# Patient Record
Sex: FEMALE | Race: OTHER | URBAN - METROPOLITAN AREA
[De-identification: names, ages, dates, MRNs, and addresses within clinical notes are randomized per-mention and may not be internally consistent; named-entity substitution may affect disease eponyms.]

---

## 2017-04-27 ENCOUNTER — EMERGENCY (EMERGENCY)
Facility: HOSPITAL | Age: 32
LOS: 0 days | Discharge: HOME | End: 2017-04-28
Admitting: FAMILY MEDICINE
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
## 2017-06-28 DIAGNOSIS — S06.0X9A CONCUSSION WITH LOSS OF CONSCIOUSNESS OF UNSPECIFIED DURATION, INITIAL ENCOUNTER: ICD-10-CM

## 2017-06-28 DIAGNOSIS — Z98.84 BARIATRIC SURGERY STATUS: ICD-10-CM

## 2017-06-28 DIAGNOSIS — S09.90XA UNSPECIFIED INJURY OF HEAD, INITIAL ENCOUNTER: ICD-10-CM
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
## 2017-11-09 ENCOUNTER — EMERGENCY (EMERGENCY)
Facility: HOSPITAL | Age: 32
LOS: 1 days | End: 2017-11-09
Admitting: FAMILY MEDICINE

## 2017-11-09 DIAGNOSIS — N61.0 MASTITIS WITHOUT ABSCESS: ICD-10-CM

## 2017-11-09 DIAGNOSIS — Z98.84 BARIATRIC SURGERY STATUS: ICD-10-CM

## 2017-11-09 DIAGNOSIS — K45.0 OTHER SPECIFIED ABDOMINAL HERNIA WITH OBSTRUCTION, WITHOUT GANGRENE: ICD-10-CM

## 2017-11-18 ENCOUNTER — EMERGENCY (EMERGENCY)
Facility: HOSPITAL | Age: 32
LOS: 0 days | Discharge: LEFT BEFORE DISCHARGE | End: 2017-11-18
Admitting: FAMILY MEDICINE

## 2017-11-18 DIAGNOSIS — Z98.890 OTHER SPECIFIED POSTPROCEDURAL STATES: ICD-10-CM

## 2017-11-18 DIAGNOSIS — R50.9 FEVER, UNSPECIFIED: ICD-10-CM

## 2017-11-18 DIAGNOSIS — K45.0 OTHER SPECIFIED ABDOMINAL HERNIA WITH OBSTRUCTION, WITHOUT GANGRENE: ICD-10-CM

## 2017-11-18 DIAGNOSIS — N61.0 MASTITIS WITHOUT ABSCESS: ICD-10-CM

## 2017-11-21 ENCOUNTER — OUTPATIENT (OUTPATIENT)
Dept: OUTPATIENT SERVICES | Facility: HOSPITAL | Age: 32
LOS: 1 days | Discharge: HOME | End: 2017-11-21

## 2017-11-21 ENCOUNTER — APPOINTMENT (OUTPATIENT)
Age: 32
End: 2017-11-21

## 2017-11-21 DIAGNOSIS — L24.4 IRRITANT CONTACT DERMATITIS DUE TO DRUGS IN CONTACT WITH SKIN: ICD-10-CM

## 2017-11-21 DIAGNOSIS — K45.0 OTHER SPECIFIED ABDOMINAL HERNIA WITH OBSTRUCTION, WITHOUT GANGRENE: ICD-10-CM

## 2017-11-21 DIAGNOSIS — N61.0 MASTITIS WITHOUT ABSCESS: ICD-10-CM

## 2017-11-21 PROBLEM — Z00.00 ENCOUNTER FOR PREVENTIVE HEALTH EXAMINATION: Status: ACTIVE | Noted: 2017-11-21

## 2017-11-28 ENCOUNTER — APPOINTMENT (OUTPATIENT)
Age: 32
End: 2017-11-28

## 2017-11-28 ENCOUNTER — OUTPATIENT (OUTPATIENT)
Dept: OUTPATIENT SERVICES | Facility: HOSPITAL | Age: 32
LOS: 1 days | Discharge: HOME | End: 2017-11-28

## 2017-11-28 DIAGNOSIS — K45.0 OTHER SPECIFIED ABDOMINAL HERNIA WITH OBSTRUCTION, WITHOUT GANGRENE: ICD-10-CM

## 2017-12-06 DIAGNOSIS — T81.4XXA INFECTION FOLLOWING A PROCEDURE, INITIAL ENCOUNTER: ICD-10-CM
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
## 2017-12-06 DIAGNOSIS — L53.9 ERYTHEMATOUS CONDITION, UNSPECIFIED: ICD-10-CM

## 2018-06-14 ENCOUNTER — RESULT REVIEW (OUTPATIENT)
Age: 33
End: 2018-06-14

## 2018-06-14 ENCOUNTER — OUTPATIENT (OUTPATIENT)
Dept: OUTPATIENT SERVICES | Facility: HOSPITAL | Age: 33
LOS: 1 days | Discharge: HOME | End: 2018-06-14

## 2018-06-14 VITALS — RESPIRATION RATE: 18 BRPM | HEART RATE: 67 BPM | SYSTOLIC BLOOD PRESSURE: 100 MMHG | DIASTOLIC BLOOD PRESSURE: 60 MMHG

## 2018-06-14 VITALS
WEIGHT: 190.04 LBS | TEMPERATURE: 97 F | DIASTOLIC BLOOD PRESSURE: 57 MMHG | RESPIRATION RATE: 18 BRPM | OXYGEN SATURATION: 100 % | HEIGHT: 62 IN | SYSTOLIC BLOOD PRESSURE: 104 MMHG | HEART RATE: 69 BPM

## 2018-06-14 DIAGNOSIS — Z98.891 HISTORY OF UTERINE SCAR FROM PREVIOUS SURGERY: Chronic | ICD-10-CM

## 2018-06-14 DIAGNOSIS — Z98.890 OTHER SPECIFIED POSTPROCEDURAL STATES: Chronic | ICD-10-CM

## 2018-06-14 DIAGNOSIS — Z98.82 BREAST IMPLANT STATUS: Chronic | ICD-10-CM

## 2018-06-14 DIAGNOSIS — Z98.84 BARIATRIC SURGERY STATUS: Chronic | ICD-10-CM

## 2018-06-14 RX ORDER — ONDANSETRON 8 MG/1
4 TABLET, FILM COATED ORAL ONCE
Qty: 0 | Refills: 0 | Status: DISCONTINUED | OUTPATIENT
Start: 2018-06-14 | End: 2018-06-29

## 2018-06-14 RX ORDER — SODIUM CHLORIDE 9 MG/ML
1000 INJECTION, SOLUTION INTRAVENOUS
Qty: 0 | Refills: 0 | Status: DISCONTINUED | OUTPATIENT
Start: 2018-06-14 | End: 2018-06-29

## 2018-06-14 NOTE — H&P PST ADULT - HISTORY OF PRESENT ILLNESS
The following is from the patient's recent office visit    CC: reflux, bloating, nausea vomiting  HPI: 32-year-old female with a history of gastric bypass in 2003 reports symptoms of bloating and nausea after eating. This usually starts after breakfast regardless of what she eats. The symptoms last for the rest of the day but did not include nighttime symptoms. This has been going on for about 6 months but has been increasing in frequency. She reports epigastric pain of a burning nature as well. During this period she has gained weight though she has significantly limited her appetite. She has had a few episodes of nonbloody vomiting over this period.  Past Medical History:  Thyroid disease, history of gastric bypass in 2003, cholecystectomy in 2010, abdominal skin surgery in 2015, abdominal hernia repair in 2013    Allergies:  None  Medications: Levothyroxine, ranitidine  Social History: Social alcohol  Family History: No family history of gastrointestinal disease

## 2018-06-14 NOTE — PRE-ANESTHESIA EVALUATION ADULT - NSANTHOSAYNRD_GEN_A_CORE
No. MERVAT screening performed.  STOP BANG Legend: 0-2 = LOW Risk; 3-4 = INTERMEDIATE Risk; 5-8 = HIGH Risk

## 2018-06-14 NOTE — ASU DISCHARGE PLAN (ADULT/PEDIATRIC). - NOTIFY
Persistent Nausea and Vomiting/Pain not relieved by Medications/Excessive Diarrhea/Fever greater than 101/Inability to Tolerate Liquids or Foods/Bleeding that does not stop/Increased Irritability or Sluggishness

## 2018-06-14 NOTE — ASU DISCHARGE PLAN (ADULT/PEDIATRIC). - ACCOMPANYING ADULT'S SIGNATURE_______________________________________
[facility] ambulatory encounter  FAMILY PRACTICE OFFICE VISIT    CHIEF COMPLAINT:    Chief Complaint   Patient presents with   • Wound Check     right finger       SUBJECTIVE:  Visit was completed through the assistance of .  Jennifer Hou is a 26 year old female who presented requesting evaluation for:    Follow up finger wound- has wound care appointment on 5/7. No changes since last visit in office. Wound looks drier. Feels pain inside the wound. No pus from wound. Has not seen increased redness. Feels like the pain is in her bone. Antibiotic ointment being used 3 times per day. Has been covering band-aid. No fevers. lv merida for pain meds  Azithromycin from Dr rajinder shepherd 5/2/18    URI- concerned that still has a cough. Has been using tessalon perles with good results, would like refill.     Pain- back pain, has had long time. Wants refill of tylenol.        Review of systems:    Review of Systems   Constitutional: Negative for chills and fever.   Respiratory: Positive for cough. Negative for sputum production and shortness of breath.           PROBLEM LIST:    Patient Active Problem List   Diagnosis   • Scleroderma (CMS/HCC)   • Interstitial lung disease due to connective tissue disease (CMS/HCC)   • IUD (intrauterine device) in place since 2015   • Mild obstructive sleep apnea   • Low ferritin   • Chronic gastritis   • Other constipation   • Chronic bilateral low back pain without sciatica   • Open wound of right index finger without damage to nail        MEDICATIONS:      Current Outpatient Prescriptions on File Prior to Visit:  mycophenolate (CELLCEPT) 500 MG tablet   ranitidine (ZANTAC) 150 MG tablet   predniSONE (DELTASONE) 20 MG tablet   polyethylene glycol (MIRALAX) powder   ondansetron (ZOFRAN) 4 MG tablet   linaclotide (LINZESS) 290 MCG   NIFEdipine (ADALAT CC) 30 MG 24 hr tablet   ferrous sulfate 325 (65 FE) MG tablet   pantoprazole (PROTONIX) 40 MG tablet   albuterol  (VENTOLIN) (2.5 MG/3ML) 0.083% nebulizer solution   ergocalciferol (DRISDOL) 38430 UNITS capsule   guaiFENesin (MUCINEX) 600 MG 12 hr tablet   neomycin-bacitracin-polymyxin-pramoxine 1 % ointment   celecoxib (CELEBREX) 200 MG capsule   docusate sodium (COLACE) 100 MG capsule   sucralfate (CARAFATE) 1 g tablet   magnesium citrate solution   oxyCODONE, IMM REL, (ROXICODONE) 15 MG immediate release tablet   sulindac (CLINORIL) 200 MG tablet   Budesonide-Formoterol Fumarate (SYMBICORT IN)     No current facility-administered medications on file prior to visit.     PAST HISTORIES:     I have reviewed the past medical history, family history, social history, medications and allergies listed in the medical record as obtained by my nursing staff and support staff and agree with their documentation.    OBJECTIVE:    PHYSICAL EXAM:   Physical Exam  Vital Signs:    Visit Vitals  /64 (BP Location: [facility], Patient Position: Sitting, Cuff Size: Large Adult)   Pulse 80   Temp 98.9 °F (37.2 °C) (Oral)   Resp 14   Ht 5' 3\" (1.6 m)   Wt 75.4 kg   BMI 29.46 kg/m²     General:  Alert, cooperative, conversive in no acute distress.  HEENT:  Normocephalic atraumatic. No adenopathy. Normal conjunctivae and sclerae. Extraocular movements intact.  Mucous membranes are moist.    Respiratory:   Normal respiratory effort.  Clear to auscultation.  No wheezes, rales or rhonchi.  Musculoskeletal:  No deformity or edema.  No tenderness to palpation.  Right finger with yellow-marguerite discoloration and one 0.5cm black scab peeling away from clean, granulated base. Mild tenderness to palpation bilaterally along lower, paraspinal back.  Skin:  Warm and dry without rash.      LAB RESULTS:   All pertinent laboratory results were reviewed.    ASSESSMENT AND PLAN:   Jennifer was seen today for wound check.    Diagnoses and all orders for this visit:    Skin lesion  Comments:  Minimal erythema, no warmth; does not appear infected. Patient received oral  antibiotics from another provider; discussed that finger does not appear infected. Encouraged topical cares and again follow up with wound care.    Acute URI  Comments:  Improved, encouraged conservative management  Orders:  -     benzonatate (TESSALON PERLES) 100 MG capsule; Take 1 capsule by mouth 3 times daily as needed for Cough.    Chronic bilateral low back pain without sciatica  Comments:  Refill requested  Orders:  -     acetaminophen (TYLENOL) 500 MG tablet; Take 2 tablets by mouth 3 times daily as needed for Pain.      Orders Placed This Encounter   • azithromycin (ZITHROMAX) 250 MG tablet   • HYDROcodone Bitartrate ER (HYSINGLA ER) 100 MG Tablet Extended Release 24 hour Abuse-Deterrent   • benzonatate (TESSALON PERLES) 100 MG capsule   • acetaminophen (TYLENOL) 500 MG tablet     There are no preventive care reminders to display for this patient.  Return in about 1 month (around 6/4/2018) for PCP- f/u wound, review meds.  No labs or tests ordered.    This patient was seen and discussed with Dr. Kyle Rosales MD           Statement Selected

## 2018-06-18 LAB — SURGICAL PATHOLOGY STUDY: SIGNIFICANT CHANGE UP

## 2018-06-20 DIAGNOSIS — Z90.49 ACQUIRED ABSENCE OF OTHER SPECIFIED PARTS OF DIGESTIVE TRACT: ICD-10-CM

## 2018-06-20 DIAGNOSIS — R11.10 VOMITING, UNSPECIFIED: ICD-10-CM

## 2018-06-20 DIAGNOSIS — E03.9 HYPOTHYROIDISM, UNSPECIFIED: ICD-10-CM

## 2018-06-20 DIAGNOSIS — Z98.0 INTESTINAL BYPASS AND ANASTOMOSIS STATUS: ICD-10-CM

## 2018-06-20 DIAGNOSIS — Z98.84 BARIATRIC SURGERY STATUS: ICD-10-CM

## 2018-06-20 DIAGNOSIS — K29.50 UNSPECIFIED CHRONIC GASTRITIS WITHOUT BLEEDING: ICD-10-CM

## 2018-06-20 DIAGNOSIS — R10.9 UNSPECIFIED ABDOMINAL PAIN: ICD-10-CM

## 2018-06-20 DIAGNOSIS — K52.9 NONINFECTIVE GASTROENTERITIS AND COLITIS, UNSPECIFIED: ICD-10-CM

## 2018-08-28 PROBLEM — Z86.39 PERSONAL HISTORY OF OTHER ENDOCRINE, NUTRITIONAL AND METABOLIC DISEASE: Chronic | Status: ACTIVE | Noted: 2018-06-14

## 2018-10-17 NOTE — ASU PATIENT PROFILE, ADULT - PSH
H/O abdominoplasty    H/O breast augmentation    H/O  section    History of gastric bypass H/O abdominoplasty    H/O breast augmentation    H/O  section    History of gastric bypass    History of surgery  THIS IS PATIENT'S FIRST COLONOSCOPY 10/18/2018

## 2018-10-18 ENCOUNTER — OUTPATIENT (OUTPATIENT)
Dept: OUTPATIENT SERVICES | Facility: HOSPITAL | Age: 33
LOS: 1 days | Discharge: HOME | End: 2018-10-18

## 2018-10-18 ENCOUNTER — RESULT REVIEW (OUTPATIENT)
Age: 33
End: 2018-10-18

## 2018-10-18 VITALS
OXYGEN SATURATION: 100 % | RESPIRATION RATE: 17 BRPM | TEMPERATURE: 98 F | HEIGHT: 62 IN | SYSTOLIC BLOOD PRESSURE: 111 MMHG | HEART RATE: 58 BPM | DIASTOLIC BLOOD PRESSURE: 58 MMHG | WEIGHT: 199.96 LBS

## 2018-10-18 VITALS — DIASTOLIC BLOOD PRESSURE: 61 MMHG | HEART RATE: 68 BPM | RESPIRATION RATE: 16 BRPM | SYSTOLIC BLOOD PRESSURE: 100 MMHG

## 2018-10-18 DIAGNOSIS — Z98.891 HISTORY OF UTERINE SCAR FROM PREVIOUS SURGERY: Chronic | ICD-10-CM

## 2018-10-18 DIAGNOSIS — Z98.84 BARIATRIC SURGERY STATUS: Chronic | ICD-10-CM

## 2018-10-18 DIAGNOSIS — Z98.890 OTHER SPECIFIED POSTPROCEDURAL STATES: Chronic | ICD-10-CM

## 2018-10-18 DIAGNOSIS — Z98.82 BREAST IMPLANT STATUS: Chronic | ICD-10-CM

## 2018-10-18 NOTE — H&P PST ADULT - HISTORY OF PRESENT ILLNESS
CC: reflux, bloating, nausea vomiting  HPI: 32-year-old female with a history of gastric bypass in 2003 initially presented to this office in May 2018 reporting symptoms of postprandial bloating and nausea after eating. This had been going on for about 6 months but has been increasing in frequency. She reported epigastric pain of a burning nature as well.   She underwent an upper endoscopy in June 14, 2018. At the anastomosis 2 large sutures were seen. One was 2.5 cm in length and the other one was 4.5 cm in length. Gastritis was noted as well random biopsies were negative for Helicobacter pylori. Biopsies of the small bowel showed mild nonspecific chronic inflammation and CD 3 staining it is pending.  The patient states that her symptoms are unchanged despite omeprazole 40 mg twice daily. She still reports epigastric abdominal pain (that can be severe) as well as vomiting. She reports occasional diarrhea without blood as well.  Past Medical History:  Thyroid disease, history of gastric bypass in 2003, cholecystectomy in 2010, abdominal skin surgery in 2015, abdominal hernia repair in 2013    Allergies:  None  Medications: Levothyroxine, ranitidine  Social History: Social alcohol  Family History: No family history of gastrointestinal disease

## 2018-10-18 NOTE — ASU DISCHARGE PLAN (ADULT/PEDIATRIC). - NOTIFY
Persistent Nausea and Vomiting/Increased Irritability or Sluggishness/Inability to Tolerate Liquids or Foods/Pain not relieved by Medications/Fever greater than 101/Bleeding that does not stop/Excessive Diarrhea

## 2018-10-18 NOTE — PROCEDURE NOTE - ADDITIONAL PROCEDURE DETAILS
-good prep  - medium hemorrhoids  - 1 small polyp in descending colon removed with cold forceps  - Normal terminal ileum    plan  -colonoscopy in 5 years  - follow up pathology in 2 weeks  - high fiber diet

## 2018-10-20 LAB — SURGICAL PATHOLOGY STUDY: SIGNIFICANT CHANGE UP

## 2018-10-29 DIAGNOSIS — E03.9 HYPOTHYROIDISM, UNSPECIFIED: ICD-10-CM

## 2018-10-29 DIAGNOSIS — Z98.84 BARIATRIC SURGERY STATUS: ICD-10-CM

## 2018-10-29 DIAGNOSIS — K64.9 UNSPECIFIED HEMORRHOIDS: ICD-10-CM

## 2018-10-29 DIAGNOSIS — K21.9 GASTRO-ESOPHAGEAL REFLUX DISEASE WITHOUT ESOPHAGITIS: ICD-10-CM

## 2018-10-29 DIAGNOSIS — K63.89 OTHER SPECIFIED DISEASES OF INTESTINE: ICD-10-CM

## 2018-10-29 DIAGNOSIS — R10.9 UNSPECIFIED ABDOMINAL PAIN: ICD-10-CM

## 2018-10-29 DIAGNOSIS — Z90.49 ACQUIRED ABSENCE OF OTHER SPECIFIED PARTS OF DIGESTIVE TRACT: ICD-10-CM

## 2020-01-19 NOTE — ASU PREOP CHECKLIST - TEMPERATURE IN FAHRENHEIT (DEGREES F)
Brought in by EMS from home. Initial call was for toe pain and hypoglycemia. While in transport patient was given oral glucose and started seizing. Administered 2mg of versed. Patient is sleepy and hard to arouse while in the ED. Significant other is in lobby. Patient finger stick was 64. Physician aware. 96.9

## 2020-05-21 ENCOUNTER — APPOINTMENT (OUTPATIENT)
Dept: GASTROENTEROLOGY | Facility: CLINIC | Age: 35
End: 2020-05-21
Payer: MEDICAID

## 2020-05-21 DIAGNOSIS — Z86.39 PERSONAL HISTORY OF OTHER ENDOCRINE, NUTRITIONAL AND METABOLIC DISEASE: ICD-10-CM

## 2020-05-21 DIAGNOSIS — Z86.010 PERSONAL HISTORY OF COLONIC POLYPS: ICD-10-CM

## 2020-05-21 DIAGNOSIS — Z78.9 OTHER SPECIFIED HEALTH STATUS: ICD-10-CM

## 2020-05-21 PROCEDURE — 99214 OFFICE O/P EST MOD 30 MIN: CPT | Mod: 95

## 2020-06-26 ENCOUNTER — LABORATORY RESULT (OUTPATIENT)
Age: 35
End: 2020-06-26

## 2020-06-26 ENCOUNTER — OUTPATIENT (OUTPATIENT)
Dept: OUTPATIENT SERVICES | Facility: HOSPITAL | Age: 35
LOS: 1 days | Discharge: HOME | End: 2020-06-26

## 2020-06-26 DIAGNOSIS — Z98.82 BREAST IMPLANT STATUS: Chronic | ICD-10-CM

## 2020-06-26 DIAGNOSIS — Z11.59 ENCOUNTER FOR SCREENING FOR OTHER VIRAL DISEASES: ICD-10-CM

## 2020-06-26 DIAGNOSIS — Z98.84 BARIATRIC SURGERY STATUS: Chronic | ICD-10-CM

## 2020-06-26 DIAGNOSIS — Z98.890 OTHER SPECIFIED POSTPROCEDURAL STATES: Chronic | ICD-10-CM

## 2020-06-26 DIAGNOSIS — Z98.891 HISTORY OF UTERINE SCAR FROM PREVIOUS SURGERY: Chronic | ICD-10-CM

## 2020-06-29 ENCOUNTER — TRANSCRIPTION ENCOUNTER (OUTPATIENT)
Age: 35
End: 2020-06-29

## 2020-06-29 ENCOUNTER — RESULT REVIEW (OUTPATIENT)
Age: 35
End: 2020-06-29

## 2020-06-29 ENCOUNTER — OUTPATIENT (OUTPATIENT)
Dept: OUTPATIENT SERVICES | Facility: HOSPITAL | Age: 35
LOS: 1 days | Discharge: HOME | End: 2020-06-29
Payer: MEDICAID

## 2020-06-29 VITALS
SYSTOLIC BLOOD PRESSURE: 96 MMHG | TEMPERATURE: 98 F | HEART RATE: 79 BPM | RESPIRATION RATE: 16 BRPM | DIASTOLIC BLOOD PRESSURE: 52 MMHG | OXYGEN SATURATION: 100 %

## 2020-06-29 VITALS — WEIGHT: 220.02 LBS | HEIGHT: 62 IN

## 2020-06-29 DIAGNOSIS — R11.0 NAUSEA: ICD-10-CM

## 2020-06-29 DIAGNOSIS — R10.84 GENERALIZED ABDOMINAL PAIN: ICD-10-CM

## 2020-06-29 DIAGNOSIS — Z98.890 OTHER SPECIFIED POSTPROCEDURAL STATES: Chronic | ICD-10-CM

## 2020-06-29 DIAGNOSIS — Z98.84 BARIATRIC SURGERY STATUS: Chronic | ICD-10-CM

## 2020-06-29 DIAGNOSIS — Z98.82 BREAST IMPLANT STATUS: Chronic | ICD-10-CM

## 2020-06-29 DIAGNOSIS — Z98.891 HISTORY OF UTERINE SCAR FROM PREVIOUS SURGERY: Chronic | ICD-10-CM

## 2020-06-29 PROCEDURE — 88305 TISSUE EXAM BY PATHOLOGIST: CPT | Mod: 26

## 2020-06-29 PROCEDURE — 88312 SPECIAL STAINS GROUP 1: CPT | Mod: 26

## 2020-06-29 PROCEDURE — 43239 EGD BIOPSY SINGLE/MULTIPLE: CPT

## 2020-06-29 RX ORDER — LEVOTHYROXINE SODIUM 125 MCG
1 TABLET ORAL
Qty: 0 | Refills: 0 | DISCHARGE

## 2020-06-29 NOTE — ASU PREOP CHECKLIST - BSA (M2)
Spoke with [facility] outpt wound clinic, prior auth obtained. Pt appt arranged for Monday 4/3/2017 at 10:00 for outpt wound care. Appt placed in Follow up section of [facility] and will print in [place] paperwork also.   [facility]  [address]  [place]  [phone]   1.99

## 2020-06-29 NOTE — HISTORY OF PRESENT ILLNESS
[Home] : at home, [unfilled] , at the time of the visit. [Other Location: e.g. Home (Enter Location, City,State)___] : at [unfilled] [Verbal consent obtained from patient] : the patient, [unfilled] [FreeTextEntry4] : Iva Manjarrez  [de-identified] : 34 year old female with a history of a gastric bypass who I saw in 2018 for posprandial bloating, nausea and abdominal pain. She underwent an EGD in 2018 which was normal except for visible intraluminal stitches (initial verison of this note indicated 'stricture" in error).\par colonoscopy 2018: 1 polyp ( path: no adenoma)\par \par She states that her pain never improved but her nausea improved for a while. Both have worsened. She reports improvement when she takes omeprazole. She ends up taking it 4 times per week. \par THe patient saw a bariatric surgeon who ordered a small bowel follow through and requested an EGD. She does not know the name or phone number of the doctor.\par \par The patient denies rectal bleeding, melena, diarrhea, constipation, change in bowel habits, change in stool caliber, weight loss,change in appetite, vomiting, difficulty swallowing, early satiety, fever or chills.\par

## 2020-06-29 NOTE — H&P PST ADULT - ASSESSMENT
34 year old female with a history of a gastric bypass who I saw in 2018 for posprandial bloating, nausea and abdominal pain. She underwent an EGD in 2018 which was normal except for visible intraluminal stitches  Now she still c/o epigastric pain and nausea.    Rec:  EGD

## 2020-06-29 NOTE — H&P PST ADULT - HISTORY OF PRESENT ILLNESS
34 year old female with a history of a gastric bypass who I saw in 2018 for posprandial bloating, nausea and abdominal pain. She underwent an EGD in 2018 which was normal except for visible intraluminal stitches  Now she still c/o epigastric pain and nausea.
Motivated and ready for change

## 2020-06-29 NOTE — ASU DISCHARGE PLAN (ADULT/PEDIATRIC) - CALL YOUR DOCTOR IF YOU HAVE ANY OF THE FOLLOWING:
Nausea and vomiting that does not stop/Bleeding that does not stop/Numbness, tingling, color or temperature change to extremity/Pain not relieved by Medications Excessive diarrhea/Pain not relieved by Medications/Swelling that gets worse/Numbness, tingling, color or temperature change to extremity/Increased irritability or sluggishness/Nausea and vomiting that does not stop/Unable to urinate/Fever greater than (need to indicate Fahrenheit or Celsius)/Bleeding that does not stop/Inability to tolerate liquids or foods

## 2020-06-29 NOTE — REVIEW OF SYSTEMS
[As Noted in HPI] : as noted in HPI [Fever] : no fever [Eye Pain] : no eye pain [Palpitations] : no palpitations [SOB on Exertion] : no shortness of breath during exertion [Joint Swelling] : no joint swelling [Skin Lesions] : no skin lesions [Convulsions] : no convulsions [Easy Bleeding] : no tendency for easy bleeding

## 2020-06-29 NOTE — ASSESSMENT
[FreeTextEntry1] : 34 year old female with hx of bariatric surgery. who has had abdominal pain and nausea for years. It has recently worsened\par \par -omeprazole 40 mg daily 1/2 hour ac breakfast\par - patient will call back with the name and/or contact number of her bariatric surgeon\par - once we have that information we will obtain records to confirm what procedure(s) is being requested.\par - we will also request the results of the patient's recent small bowel follow through.\par \par  number [id number]

## 2020-06-29 NOTE — ASU PATIENT PROFILE, ADULT - PSH
H/O abdominoplasty    H/O breast augmentation    H/O  section    History of gastric bypass    History of surgery  THIS IS PATIENT'S FIRST COLONOSCOPY 10/18/2018

## 2020-07-02 ENCOUNTER — APPOINTMENT (OUTPATIENT)
Dept: GASTROENTEROLOGY | Facility: CLINIC | Age: 35
End: 2020-07-02
Payer: MEDICAID

## 2020-07-02 LAB — SURGICAL PATHOLOGY STUDY: SIGNIFICANT CHANGE UP

## 2020-07-02 PROCEDURE — 99442: CPT

## 2020-07-02 RX ORDER — AMOXICILLIN 500 MG/1
500 TABLET, FILM COATED ORAL
Qty: 56 | Refills: 0 | Status: ACTIVE | COMMUNITY
Start: 2020-07-02 | End: 1900-01-01

## 2020-07-02 RX ORDER — OMEPRAZOLE 20 MG/1
20 TABLET, DELAYED RELEASE ORAL
Qty: 28 | Refills: 0 | Status: ACTIVE | COMMUNITY
Start: 2020-07-02 | End: 1900-01-01

## 2020-07-02 RX ORDER — CLARITHROMYCIN 500 MG/1
500 TABLET, FILM COATED ORAL
Qty: 28 | Refills: 0 | Status: ACTIVE | COMMUNITY
Start: 2020-07-02 | End: 1900-01-01

## 2020-07-07 DIAGNOSIS — B96.81 HELICOBACTER PYLORI [H. PYLORI] AS THE CAUSE OF DISEASES CLASSIFIED ELSEWHERE: ICD-10-CM

## 2020-07-07 DIAGNOSIS — K29.50 UNSPECIFIED CHRONIC GASTRITIS WITHOUT BLEEDING: ICD-10-CM

## 2020-07-07 DIAGNOSIS — R10.13 EPIGASTRIC PAIN: ICD-10-CM

## 2020-07-07 DIAGNOSIS — Z98.84 BARIATRIC SURGERY STATUS: ICD-10-CM

## 2020-07-15 ENCOUNTER — APPOINTMENT (OUTPATIENT)
Dept: OBGYN | Facility: CLINIC | Age: 35
End: 2020-07-15
Payer: MEDICAID

## 2020-07-15 VITALS
HEIGHT: 62 IN | BODY MASS INDEX: 42.33 KG/M2 | DIASTOLIC BLOOD PRESSURE: 70 MMHG | SYSTOLIC BLOOD PRESSURE: 100 MMHG | WEIGHT: 230 LBS

## 2020-07-15 DIAGNOSIS — Z86.19 PERSONAL HISTORY OF OTHER INFECTIOUS AND PARASITIC DISEASES: ICD-10-CM

## 2020-07-15 PROCEDURE — 99385 PREV VISIT NEW AGE 18-39: CPT

## 2020-07-15 NOTE — PHYSICAL EXAM
[Awake] : awake [Alert] : alert [Acute Distress] : no acute distress [Nipple Discharge] : no nipple discharge [Mass] : no breast mass [Soft] : soft [Distended] : not distended [Tender] : non tender [No Lesions] : no genitalia lesions [Normal] : vagina [Pink Rugae] : pink rugae [Labia Majora] : labia major [No Bleeding] : there was no active vaginal bleeding [Discharge] : had a ~M discharge [White] : white [Cheesy] : cheesy [Pap Obtained] : a Pap smear was performed [FreeTextEntry7] : unable to assess due to body habitus

## 2020-07-15 NOTE — HISTORY OF PRESENT ILLNESS
[1 Year Ago] : 1 year ago [Last Pap ___] : Last cervical pap smear was [unfilled] [Weight Concerns] : weight concens [Reproductive Age] : is of reproductive age [Menstrual Problems] : reports normal menses [Contraception] : uses contraception [Tubal Occlusion] : has had a tubal occlusion

## 2020-07-19 LAB — HPV HIGH+LOW RISK DNA PNL CVX: NOT DETECTED

## 2020-07-23 LAB
A VAGINAE DNA VAG QL NAA+PROBE: NORMAL
BVAB2 DNA VAG QL NAA+PROBE: NORMAL
C KRUSEI DNA VAG QL NAA+PROBE: NEGATIVE
MEGA1 DNA VAG QL NAA+PROBE: NORMAL
T VAGINALIS RRNA SPEC QL NAA+PROBE: NEGATIVE

## 2020-08-13 ENCOUNTER — APPOINTMENT (OUTPATIENT)
Dept: OTOLARYNGOLOGY | Facility: CLINIC | Age: 35
End: 2020-08-13
Payer: MEDICAID

## 2020-08-13 DIAGNOSIS — J38.2 NODULES OF VOCAL CORDS: ICD-10-CM

## 2020-08-13 DIAGNOSIS — K21.9 GASTRO-ESOPHAGEAL REFLUX DISEASE W/OUT ESOPHAGITIS: ICD-10-CM

## 2020-08-13 PROCEDURE — 99203 OFFICE O/P NEW LOW 30 MIN: CPT | Mod: 25

## 2020-08-13 PROCEDURE — 31575 DIAGNOSTIC LARYNGOSCOPY: CPT

## 2020-08-13 RX ORDER — FAMOTIDINE 20 MG/1
20 TABLET, FILM COATED ORAL
Qty: 90 | Refills: 1 | Status: ACTIVE | COMMUNITY
Start: 2020-08-13 | End: 1900-01-01

## 2020-08-13 RX ORDER — OMEPRAZOLE 40 MG/1
40 CAPSULE, DELAYED RELEASE ORAL
Qty: 180 | Refills: 1 | Status: ACTIVE | COMMUNITY
Start: 2020-08-13 | End: 1900-01-01

## 2020-08-13 NOTE — PROCEDURE
[Flexible Endoscope] : examined with the flexible endoscope [True Vocal Cords Winters's Nodules] : bilateral true vocal cord nodule(s) [Normal] : posterior cricoid area had healthy pink mucosa in the interarytenoid area and the esophageal inlet

## 2020-08-14 NOTE — HISTORY OF PRESENT ILLNESS
[de-identified] : 33 yo female presents with throat pain radiating to the ears, alternating sides, for a few years.  Pt also has noticeable hoarseness when she has not slept well.  Pt has never been diagnosed with strep throat and has not had a tonsillectomy.  Pt is a nonsmoker.  She has been taking omeprazole since gastric bypass surgery in 2013.  No dysphagia or FB sensation.

## 2020-08-14 NOTE — REASON FOR VISIT
[Ear Pain] : ear pain [Initial Evaluation] : an initial evaluation for [Throat Pain] : throat pain [FreeTextEntry2] : sore throat

## 2020-08-19 ENCOUNTER — APPOINTMENT (OUTPATIENT)
Dept: OBGYN | Facility: CLINIC | Age: 35
End: 2020-08-19

## 2020-09-22 ENCOUNTER — APPOINTMENT (OUTPATIENT)
Dept: GASTROENTEROLOGY | Facility: CLINIC | Age: 35
End: 2020-09-22

## 2020-09-27 ENCOUNTER — OUTPATIENT (OUTPATIENT)
Dept: OUTPATIENT SERVICES | Facility: HOSPITAL | Age: 35
LOS: 1 days | Discharge: HOME | End: 2020-09-27

## 2020-09-27 ENCOUNTER — LABORATORY RESULT (OUTPATIENT)
Age: 35
End: 2020-09-27

## 2020-09-27 DIAGNOSIS — Z11.59 ENCOUNTER FOR SCREENING FOR OTHER VIRAL DISEASES: ICD-10-CM

## 2020-09-27 DIAGNOSIS — Z98.890 OTHER SPECIFIED POSTPROCEDURAL STATES: Chronic | ICD-10-CM

## 2020-09-27 DIAGNOSIS — Z98.82 BREAST IMPLANT STATUS: Chronic | ICD-10-CM

## 2020-09-27 DIAGNOSIS — Z98.891 HISTORY OF UTERINE SCAR FROM PREVIOUS SURGERY: Chronic | ICD-10-CM

## 2020-09-27 DIAGNOSIS — Z98.84 BARIATRIC SURGERY STATUS: Chronic | ICD-10-CM

## 2020-09-30 ENCOUNTER — RESULT REVIEW (OUTPATIENT)
Age: 35
End: 2020-09-30

## 2020-09-30 ENCOUNTER — OUTPATIENT (OUTPATIENT)
Dept: OUTPATIENT SERVICES | Facility: HOSPITAL | Age: 35
LOS: 1 days | Discharge: HOME | End: 2020-09-30
Payer: MEDICAID

## 2020-09-30 ENCOUNTER — TRANSCRIPTION ENCOUNTER (OUTPATIENT)
Age: 35
End: 2020-09-30

## 2020-09-30 VITALS
RESPIRATION RATE: 18 BRPM | HEART RATE: 68 BPM | DIASTOLIC BLOOD PRESSURE: 68 MMHG | OXYGEN SATURATION: 100 % | SYSTOLIC BLOOD PRESSURE: 124 MMHG

## 2020-09-30 VITALS
DIASTOLIC BLOOD PRESSURE: 50 MMHG | RESPIRATION RATE: 20 BRPM | HEART RATE: 63 BPM | TEMPERATURE: 99 F | SYSTOLIC BLOOD PRESSURE: 107 MMHG | WEIGHT: 220.02 LBS | HEIGHT: 62 IN

## 2020-09-30 DIAGNOSIS — Z98.890 OTHER SPECIFIED POSTPROCEDURAL STATES: Chronic | ICD-10-CM

## 2020-09-30 DIAGNOSIS — Z98.82 BREAST IMPLANT STATUS: Chronic | ICD-10-CM

## 2020-09-30 DIAGNOSIS — Z98.891 HISTORY OF UTERINE SCAR FROM PREVIOUS SURGERY: Chronic | ICD-10-CM

## 2020-09-30 DIAGNOSIS — Z98.84 BARIATRIC SURGERY STATUS: Chronic | ICD-10-CM

## 2020-09-30 PROCEDURE — 88305 TISSUE EXAM BY PATHOLOGIST: CPT | Mod: 26

## 2020-09-30 PROCEDURE — 43239 EGD BIOPSY SINGLE/MULTIPLE: CPT

## 2020-09-30 PROCEDURE — 88312 SPECIAL STAINS GROUP 1: CPT | Mod: 26

## 2020-09-30 RX ORDER — OMEPRAZOLE 10 MG/1
1 CAPSULE, DELAYED RELEASE ORAL
Qty: 0 | Refills: 0 | DISCHARGE

## 2020-09-30 RX ORDER — OMEPRAZOLE 10 MG/1
1 CAPSULE, DELAYED RELEASE ORAL
Qty: 30 | Refills: 0
Start: 2020-09-30 | End: 2020-10-29

## 2020-09-30 NOTE — H&P PST ADULT - NSICDXPASTSURGICALHX_GEN_ALL_CORE_FT
PAST SURGICAL HISTORY:  H/O abdominoplasty     H/O breast augmentation     H/O  section     History of gastric bypass     History of surgery THIS IS PATIENT'S FIRST COLONOSCOPY 10/18/2018

## 2020-09-30 NOTE — CHART NOTE - NSCHARTNOTEFT_GEN_A_CORE
PACU ANESTHESIA ADMISSION NOTE      Procedure:   Post op diagnosis:      ____  Intubated  TV:______       Rate: ______      FiO2: ______    __x__  Patent Airway    __x__  Full return of protective reflexes    __x__  Full recovery from anesthesia / back to baseline status    Vitals:  T(C): 37.2 (09-30-20 @ 10:20), Max: 37.2 (09-30-20 @ 09:49)  HR: 63 (09-30-20 @ 10:20) (63 - 63)  BP: 107/50 (09-30-20 @ 10:20) (107/50 - 107/50)  RR: 20 (09-30-20 @ 10:20) (20 - 20)  SpO2: --    Mental Status:  __x__ Awake   ___x__ Alert   _____ Drowsy   _____ Sedated    Nausea/Vomiting:  __x__ NO  ______Yes,   See Post - Op Orders          Pain Scale (0-10):  _____    Treatment: ____ None    __x__ See Post - Op/PCA Orders    Post - Operative Fluids:   ____ Oral   __x__ See Post - Op Orders    Plan: Discharge:   __X__Home       _____Floor     _____Critical Care    _____  Other:_________________    Comments: Patient had smooth intraoperative event, no anesthesia complication.  PACU Vital signs: HR:   64         BP:  115      / 57         RR:  18           O2 Sat:  100     %     Temp97.5f

## 2020-09-30 NOTE — H&P PST ADULT - HISTORY OF PRESENT ILLNESS
34 year old female with hx of bariatric surgery. who has had abdominal pain and nausea for years. It has recently worsened. started on omeprazole 40 mg 34 year old female with hx of bariatric surgery 2013. who has had abdominal pain and nausea for years. It has recently worsened. started on omeprazole 40 mg, still has the pain and nausea

## 2020-09-30 NOTE — H&P PST ADULT - ASSESSMENT
34 year old female with hx of bariatric surgery 2013. who has had abdominal pain and nausea for years. It has recently worsened. started on omeprazole 40 mg, still has the pain and nausea   here for EGD

## 2020-09-30 NOTE — ASU DISCHARGE PLAN (ADULT/PEDIATRIC) - FOLLOW UP APPOINTMENTS
[facility]:  Endoscopy/Ambulatory Surgery [place]... 911 or go to the nearest Emergency Room

## 2020-09-30 NOTE — ASU DISCHARGE PLAN (ADULT/PEDIATRIC) - CALL YOUR DOCTOR IF YOU HAVE ANY OF THE FOLLOWING:
Pain not relieved by Medications/Inability to tolerate liquids or foods/Nausea and vomiting that does not stop/Fever greater than (need to indicate Fahrenheit or Celsius)/Bleeding that does not stop/Excessive diarrhea/Swelling that gets worse/Unable to urinate Fever greater than (need to indicate Fahrenheit or Celsius)/Excessive diarrhea/Increased irritability or sluggishness/Numbness, tingling, color or temperature change to extremity/Bleeding that does not stop/Swelling that gets worse/Pain not relieved by Medications/Inability to tolerate liquids or foods/Nausea and vomiting that does not stop/Unable to urinate

## 2020-09-30 NOTE — PRE-ANESTHESIA EVALUATION ADULT - HEIGHT IN CM
Subjective:       Patient ID: Lilibeth Bhatti is a 56 y.o. female.    Chief Complaint: Follow-up (checkup- burning in feet)    In for f/u and med refills.  Reports -180mg/dl taking Levemir 36 units qhs and Novolog 16 units before meals 2 times daily.  C/o intermittent burning to bottom or feet bilaterally.  DM x 24 years.        Review of Systems   Constitutional: Negative.  Negative for appetite change, fatigue and fever.   HENT: Negative.  Negative for congestion, ear pain and sore throat.         Intermittent HA with cough and sinus congestion.     Eyes: Negative.  Negative for visual disturbance.   Respiratory: Negative.  Negative for cough, shortness of breath and wheezing.    Cardiovascular: Negative.    Gastrointestinal: Positive for constipation and diarrhea. Negative for abdominal pain, nausea and vomiting.        Alternating bouts of constipation and diarrhea. Controlled with diet.     Endocrine: Negative.  Negative for polyuria.        Blood sugars running high   Genitourinary: Negative.  Negative for difficulty urinating and urgency.   Musculoskeletal: Negative.  Negative for arthralgias and myalgias.        Restless leg; Pain keeping her up at night a few time a week.  Out of flexeril.     Skin: Negative.  Negative for color change.   Allergic/Immunologic: Negative.    Neurological: Negative.  Negative for dizziness and headaches.   Hematological: Negative.    Psychiatric/Behavioral: Negative.  Negative for sleep disturbance.   All other systems reviewed and are negative.      Objective:      Physical Exam   Constitutional: She is oriented to person, place, and time. She appears well-developed and well-nourished. No distress.   8 # weight gain   HENT:   Head: Normocephalic and atraumatic.   Eyes: EOM are normal. Pupils are equal, round, and reactive to light.   Neck: Normal range of motion. Neck supple.   Cardiovascular: Normal rate, regular rhythm and normal heart sounds.   No murmur  "heard.  Pulmonary/Chest: Effort normal and breath sounds normal. No respiratory distress.   Abdominal: Soft.   Musculoskeletal: Normal range of motion.   Neurological: She is alert and oriented to person, place, and time.   Protective Sensation (w/ 10 gram monofilament):  Right: Intact  Left: Intact    Visual Inspection:  Normal -  Bilateral    Pedal Pulses:   Right: Present  Left: Present    Posterior tibialis:   Right:Present  Left: Present   Skin: Skin is warm and dry. Capillary refill takes less than 2 seconds.   Foot screen performed; "0"   Psychiatric: She has a normal mood and affect. Her behavior is normal.   Nursing note and vitals reviewed.      Assessment:       1. RLS (restless legs syndrome)    2. Stress headaches    3. Anxiety    4. Type 2 diabetes mellitus with both eyes affected by proliferative retinopathy and macular edema, with long-term current use of insulin    5. Insomnia, unspecified type    6. Mixed hyperlipidemia        Plan:     Lilibeth was seen today for follow-up.    Diagnoses and all orders for this visit:    RLS (restless legs syndrome)  -     cyclobenzaprine (FLEXERIL) 10 MG tablet; Take 1 tablet (10 mg total) by mouth nightly as needed for Muscle spasms.  -     Vitamin D    Stress headaches  -     butalbital-acetaminophen-caffeine -40 mg (FIORICET, ESGIC) -40 mg per tablet; TAKE ONE TABLET BY MOUTH EVERY 6 HOURS AS NEEDED FOR HEADACHE    Anxiety  -     clonazePAM (KLONOPIN) 1 MG tablet; Take 1 tablet (1 mg total) by mouth every evening.  -     TSH  -     Vitamin D    Type 2 diabetes mellitus with proliferative retinopathy and macular edema  -     Change Levemir to insulin aspart U-100 (NOVOLOG FLEXPEN U-100 INSULIN) 100 unit/mL InPn pen; Inject 16 Units into the skin 3 (three) times daily with meals.  -     alogliptin (NESINA) 25 mg Tab; Take 1 tablet by mouth once daily.  -     insulin glargine (BASAGLAR KWIKPEN U-100 INSULIN) 100 unit/mL (3 mL) InPn pen; Inject 40 Units " into the skin 2 (two) times daily.  -     CBC auto differential  -     Comprehensive metabolic panel  -     Hemoglobin A1c  -     Microalbumin/creatinine urine ratio    Insomnia, unspecified type  -     traZODone (DESYREL) 100 MG tablet; Take 1 tablet (100 mg total) by mouth every evening.  -     TSH    Mixed hyperlipidemia  -     Lipid panel    RTC 2 weeks for recheck     157.48

## 2020-10-01 LAB — SURGICAL PATHOLOGY STUDY: SIGNIFICANT CHANGE UP

## 2020-10-03 DIAGNOSIS — E03.9 HYPOTHYROIDISM, UNSPECIFIED: ICD-10-CM

## 2020-10-03 DIAGNOSIS — R10.13 EPIGASTRIC PAIN: ICD-10-CM

## 2020-10-03 DIAGNOSIS — K29.50 UNSPECIFIED CHRONIC GASTRITIS WITHOUT BLEEDING: ICD-10-CM

## 2020-10-03 DIAGNOSIS — Z98.84 BARIATRIC SURGERY STATUS: ICD-10-CM

## 2020-10-08 ENCOUNTER — APPOINTMENT (OUTPATIENT)
Dept: OTOLARYNGOLOGY | Facility: CLINIC | Age: 35
End: 2020-10-08

## 2020-10-26 ENCOUNTER — APPOINTMENT (OUTPATIENT)
Dept: OTOLARYNGOLOGY | Facility: CLINIC | Age: 35
End: 2020-10-26

## 2020-11-03 ENCOUNTER — APPOINTMENT (OUTPATIENT)
Dept: GASTROENTEROLOGY | Facility: CLINIC | Age: 35
End: 2020-11-03
Payer: MEDICAID

## 2020-11-03 DIAGNOSIS — R10.84 GENERALIZED ABDOMINAL PAIN: ICD-10-CM

## 2020-11-03 DIAGNOSIS — B96.81 GASTRITIS, UNSPECIFIED, W/OUT BLEEDING: ICD-10-CM

## 2020-11-03 DIAGNOSIS — R11.0 NAUSEA: ICD-10-CM

## 2020-11-03 DIAGNOSIS — K29.70 GASTRITIS, UNSPECIFIED, W/OUT BLEEDING: ICD-10-CM

## 2020-11-03 PROCEDURE — 99443: CPT

## 2020-11-03 NOTE — HISTORY OF PRESENT ILLNESS
[Home] : at home, [unfilled] , at the time of the visit. [Other Location: e.g. Home (Enter Location, City,State)___] : at [unfilled] [Verbal consent obtained from patient] : the patient, [unfilled] [FreeTextEntry4] : Leydi Silver MA [de-identified] : 34 year old female with a history of a gastric bypass who I saw in 2018 for postprandial bloating, nausea and abdominal pain. She underwent an EGD in 2018 which was normal except for visible intraluminal stitches (initial version of this note indicated 'stricture" in error).\par colonoscopy 2018: 1 polyp ( path: no adenoma)\par \par At her visit in May 2020 she indicated that her pain never improved but her nausea improved for a while. Both had worsened. She reported improvement when she took omeprazole. She ended up taking it 4 times per week. \par The patient saw a bariatric surgeon (Bhargav) who ordered a small bowel follow through and requested an EGD.  \par \par EGD on Sept 30 showed  persistent visible  sutures. Biopsies showed no H pylori.\par \par She reports post prandial nausea and pain that forces her to stop eating.\par \par The patient denies rectal bleeding, melena, diarrhea, constipation, change in bowel habits, change in stool caliber, weight loss,change in appetite, vomiting, difficulty swallowing, early satiety, fever or chills.\par  \par  \par \par \par #[id number]

## 2020-11-03 NOTE — REVIEW OF SYSTEMS
[Fever] : no fever [Eye Pain] : no eye pain [Palpitations] : no palpitations [SOB on Exertion] : no shortness of breath during exertion [As Noted in HPI] : as noted in HPI [Convulsions] : no convulsions [Easy Bleeding] : no tendency for easy bleeding

## 2020-11-03 NOTE — ASSESSMENT
[FreeTextEntry1] : 34 year old female with hx of bariatric surgery. who has had abdominal pain and nausea for years.  \par \par -continue PPI\par - Zofran prn\par - Advised patient to eat smaller meals\par - If ineffective, will advise patient to return to Dr. Durant.\par

## 2020-11-03 NOTE — CONSULT LETTER
[Dear  ___] : Dear  [unfilled], [Consult Letter:] : I had the pleasure of evaluating your patient, [unfilled]. [Please see my note below.] : Please see my note below. [Consult Closing:] : Thank you very much for allowing me to participate in the care of this patient.  If you have any questions, please do not hesitate to contact me. [Sincerely,] : Sincerely, [FreeTextEntry3] : Cabrera Holley

## 2020-12-01 ENCOUNTER — APPOINTMENT (OUTPATIENT)
Dept: OTOLARYNGOLOGY | Facility: CLINIC | Age: 35
End: 2020-12-01

## 2020-12-23 PROBLEM — Z86.19 HISTORY OF CANDIDIASIS OF VAGINA: Status: RESOLVED | Noted: 2020-07-15 | Resolved: 2020-12-23

## 2021-01-27 ENCOUNTER — APPOINTMENT (OUTPATIENT)
Dept: OBGYN | Facility: CLINIC | Age: 36
End: 2021-01-27
Payer: MEDICAID

## 2021-01-27 VITALS
WEIGHT: 232 LBS | BODY MASS INDEX: 42.69 KG/M2 | SYSTOLIC BLOOD PRESSURE: 115 MMHG | HEIGHT: 62 IN | DIASTOLIC BLOOD PRESSURE: 70 MMHG

## 2021-01-27 PROCEDURE — 99072 ADDL SUPL MATRL&STAF TM PHE: CPT

## 2021-01-27 PROCEDURE — 99213 OFFICE O/P EST LOW 20 MIN: CPT

## 2021-01-27 RX ORDER — TERCONAZOLE 8 MG/G
0.8 CREAM VAGINAL
Qty: 1 | Refills: 4 | Status: ACTIVE | COMMUNITY
Start: 2020-07-15 | End: 1900-01-01

## 2021-01-27 NOTE — HISTORY OF PRESENT ILLNESS
[FreeTextEntry1] : 34 yo presents for vulvar itching and burning. reports similar to symptoms when she was seen 7/2020. Denies any abnormal discharge. She used an OTC medication from the [place] that was an ovule that she inserted, last used last night. Report some dysuria.

## 2021-01-27 NOTE — PHYSICAL EXAM
[Appropriately responsive] : appropriately responsive [Labia Majora] : normal [Labia Minora] : normal [Pink Rugae] : pink rugae [No Bleeding] : There was no active vaginal bleeding [Normal] : normal [Uterine Adnexae] : normal [FreeTextEntry4] : Foreign body that appeared to looks like tissue paper removed from vaginal vault w/ forceps. Patient reports this is the remnant of the ovule she inserted the night prior.

## 2021-02-04 LAB
A VAGINAE DNA VAG QL NAA+PROBE: NORMAL
BVAB2 DNA VAG QL NAA+PROBE: NORMAL
C KRUSEI DNA VAG QL NAA+PROBE: NEGATIVE
C KRUSEI DNA VAG QL NAA+PROBE: POSITIVE
C TRACH RRNA SPEC QL NAA+PROBE: NEGATIVE
MEGA1 DNA VAG QL NAA+PROBE: NORMAL
N GONORRHOEA RRNA SPEC QL NAA+PROBE: NEGATIVE
T VAGINALIS RRNA SPEC QL NAA+PROBE: NEGATIVE

## 2021-02-22 RX ORDER — FLUCONAZOLE 150 MG/1
150 TABLET ORAL
Qty: 1 | Refills: 2 | Status: ACTIVE | COMMUNITY
Start: 2021-02-22 | End: 1900-01-01

## 2021-03-17 ENCOUNTER — APPOINTMENT (OUTPATIENT)
Dept: OBGYN | Facility: CLINIC | Age: 36
End: 2021-03-17

## 2022-07-31 ENCOUNTER — EMERGENCY (EMERGENCY)
Facility: HOSPITAL | Age: 37
LOS: 0 days | Discharge: HOME | End: 2022-07-31
Attending: EMERGENCY MEDICINE | Admitting: EMERGENCY MEDICINE

## 2022-07-31 VITALS
TEMPERATURE: 98 F | HEART RATE: 84 BPM | OXYGEN SATURATION: 99 % | SYSTOLIC BLOOD PRESSURE: 141 MMHG | RESPIRATION RATE: 18 BRPM | DIASTOLIC BLOOD PRESSURE: 77 MMHG | HEIGHT: 62 IN | WEIGHT: 240.08 LBS
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
## 2022-07-31 DIAGNOSIS — Z98.890 OTHER SPECIFIED POSTPROCEDURAL STATES: Chronic | ICD-10-CM

## 2022-07-31 DIAGNOSIS — S99.912A UNSPECIFIED INJURY OF LEFT ANKLE, INITIAL ENCOUNTER: ICD-10-CM

## 2022-07-31 DIAGNOSIS — Z98.82 BREAST IMPLANT STATUS: ICD-10-CM

## 2022-07-31 DIAGNOSIS — Z98.84 BARIATRIC SURGERY STATUS: ICD-10-CM

## 2022-07-31 DIAGNOSIS — Z98.891 HISTORY OF UTERINE SCAR FROM PREVIOUS SURGERY: Chronic | ICD-10-CM

## 2022-07-31 DIAGNOSIS — Z98.82 BREAST IMPLANT STATUS: Chronic | ICD-10-CM

## 2022-07-31 DIAGNOSIS — E03.9 HYPOTHYROIDISM, UNSPECIFIED: ICD-10-CM
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
## 2022-07-31 DIAGNOSIS — Z98.84 BARIATRIC SURGERY STATUS: Chronic | ICD-10-CM

## 2022-07-31 DIAGNOSIS — S82.65XA NONDISPLACED FRACTURE OF LATERAL MALLEOLUS OF LEFT FIBULA, INITIAL ENCOUNTER FOR CLOSED FRACTURE: ICD-10-CM

## 2022-07-31 PROCEDURE — 27786 TREATMENT OF ANKLE FRACTURE: CPT | Mod: 54

## 2022-07-31 PROCEDURE — 99284 EMERGENCY DEPT VISIT MOD MDM: CPT | Mod: 57

## 2022-07-31 PROCEDURE — 73610 X-RAY EXAM OF ANKLE: CPT | Mod: 26,LT

## 2022-07-31 PROCEDURE — 73630 X-RAY EXAM OF FOOT: CPT | Mod: 26,LT

## 2022-07-31 PROCEDURE — 73590 X-RAY EXAM OF LOWER LEG: CPT | Mod: 26,LT

## 2022-07-31 NOTE — ED PROVIDER NOTE - NS ED MD DISPO DISCHARGE CCDA
[de-identified] : 24 year old female patient came in for preventive visit.\par patient works in [employer], . \par exercises regularly, admits to healthy diet,\par sexually active with males only, uses condoms for most encounters. \par last pap is unknown has a gynecology, [facility] 
Patient/Caregiver provided printed discharge information.

## 2022-07-31 NOTE — ED PROVIDER NOTE - PRINCIPAL DIAGNOSIS
Called and spoke with patient's wife who verbalized agreement for admission on 3/1/22. Called and spoke with Via Debora Miller at patient access and made arrangements for patient's stay at [facility] on [address] on 3/1/22. Felipa Rahman NP made aware. Ankle fracture

## 2022-07-31 NOTE — ED PROVIDER NOTE - CARE PROVIDER_API CALL
Hank Rivera (MD)  Orthopaedic Surgery  [address] [place] [address]  Phone: [phone]  Fax: [phone]  Follow Up Time:

## 2022-07-31 NOTE — ED PROVIDER NOTE - CLINICAL SUMMARY MEDICAL DECISION MAKING FREE TEXT BOX
Patient presented status post left ankle injury after accidentally twisting it prior to arrival.  Otherwise on arrival patient afebrile, hemodynamically stable, neurovascularly intact, but tenderness to the fibular region.  X-ray obtained which revealed an acute nondisplaced distal left fibular fracture.  No other fractures identified.  Placed in splint given crutches and will provide outpatient Ortho follow-up.  Patient agreeable with plan. Agrees to return to ED for any new or worsening symptoms.

## 2022-07-31 NOTE — ED PROVIDER NOTE - OBJECTIVE STATEMENT
36y F no ppmh presents for eval of L ankle injury. Pt tripped and had an inversion injury to her L ankle today, since has moderate aching pain and swelling. Aggravated with wb, no relieving factors.

## 2022-07-31 NOTE — ED PROVIDER NOTE - NS ED ATTENDING STATEMENT MOD
This was a shared visit with the LILIANA. I reviewed and verified the documentation and independently performed the documented:

## 2022-07-31 NOTE — ED PROVIDER NOTE - PATIENT PORTAL LINK FT
You can access the FollowMyHealth Patient Portal offered by [facility] by registering at the following website: [email or web address]. By joining [employer]’s FollowMyHealth portal, you will also be able to view your health information using other applications (apps) compatible with our system.

## 2022-07-31 NOTE — ED PROVIDER NOTE - NS ED ROS FT
Tubular Elastic Compression Bandage    Validated order and confirmed with nurse to proceed with measurement and application of tubular elastic compression bandage.    Measured for Tetra- with Low (5-10 mmHg) compression level.    Right/Left/Bilateral Location Circumference Size Length   left Lower Leg 32  cm E 66  cm   right Lower Leg 30  cm E 66  cm   Jillian Coleman  Patient instructions sheet provided to RN/Patient.   Constitutional: (-) fever  Eyes/ENT: (-) blurry vision, (-) epistaxis  Cardiovascular: (-) chest pain, (-) syncope  Respiratory: (-) cough, (-) shortness of breath  Gastrointestinal: (-) vomiting, (-) diarrhea  : (-) dysuria, (-) hematuria  Musculoskeletal: (+) L ankle pain, (-) neck pain, (-) back pain  Integumentary: (+) swelling, (-) rash  Neurological: (-) headache, (-) altered mental status  Allergic/Immunologic: (-) pruritus

## 2022-07-31 NOTE — ED PROVIDER NOTE - PHYSICAL EXAMINATION
CONST: NAD  EYES: Sclera and conjunctiva clear.   ENT: No nasal discharge. Oropharynx normal appearing  NECK: Non-tender, no meningeal signs. normal ROM. supple   CARD: S1 S2; No jvd  RESP: Equal BS B/L, No wheezes, rhonchi or rales. No distress  GI: Soft, non-tender, non-distended. normal BS  MS: L lateral ankle tenderness. Normal ROM in all extremities. pulses 2 +.   SKIN: L ankle swelling  NEURO: A&Ox3, No focal deficits. Strength 5/5 with no sensory deficits.

## 2022-08-01 ENCOUNTER — APPOINTMENT (OUTPATIENT)
Dept: ORTHOPEDIC SURGERY | Facility: CLINIC | Age: 37
End: 2022-08-01

## 2022-08-01 VITALS — HEIGHT: 62 IN | BODY MASS INDEX: 44.16 KG/M2 | WEIGHT: 240 LBS

## 2022-08-01 PROCEDURE — 73610 X-RAY EXAM OF ANKLE: CPT | Mod: LT

## 2022-08-01 PROCEDURE — 99203 OFFICE O/P NEW LOW 30 MIN: CPT

## 2022-08-01 RX ORDER — NABUMETONE 500 MG/1
500 TABLET, FILM COATED ORAL
Qty: 60 | Refills: 0 | Status: ACTIVE | COMMUNITY
Start: 2022-08-01 | End: 1900-01-01

## 2022-08-01 NOTE — DISCUSSION/SUMMARY
[de-identified] : At this time I placed her in a tall Cam walker boot, she is having difficulty using the crutches so I gave her script for a knee walker.  She is going to keep the foot up and elevated, ice, anti-inflammatories as needed for pain.  I sent a script to the pharmacy.  Will see her back in 2-3 weeks for repeat x-rays and evaluation. Patient will call me if any other problems or concerns.  Patient verbalized understanding and agreed with the plan, all questions were answered in the office today.\par \par This patient was seen under the supervision of Dr. Rivera.

## 2022-08-01 NOTE — IMAGING
[de-identified] : On examination of her left ankle she has mild swelling, no erythema, no ecchymosis.  She is tender over the lateral malleolus and the ATFL.  She is nontender over medial malleolus or deltoid.  She is nontender over the talar dome.  She has tenderness over the anterior tibia.  She is nontender over the Achilles, no tenderness over the calcaneus.  Negative Wang's test, no calf tenderness.  She has no point tenderness palpation of the foot.  She is able to dorsiflex and plantar flex, sensation is intact throughout, 2+ DP and PT pulses.\par \par  X-rays taken at the hospital of the left ankle show a nondisplaced lateral malleolus fracture.  Repeat x-rays taken in the office today weight-bearing continue to show a nondisplaced lateral malleolus fracture, there is no widening of the medial clear space or the syndesmosis.\par   X-rays taken at the hospital of the left foot and left tib-fib show no acute fractures, dislocations, or other bony abnormalities.

## 2022-08-01 NOTE — HISTORY OF PRESENT ILLNESS
[de-identified] :  36-year-old female is here today for evaluation of her left ankle and foot.  Patient states she was walking and she rolled her ankle and fell.  This happened a few days ago.  She was seen in the emergency room and was placed in a splint.  She is unable to put any weight on the foot.  She is here today for repeat evaluation.  She denies any numbness tingling in the foot or any calf pain.

## 2022-08-18 RX ORDER — FLUCONAZOLE 150 MG/1
150 TABLET ORAL
Qty: 1 | Refills: 3 | Status: ACTIVE | COMMUNITY
Start: 2022-08-18 | End: 1900-01-01

## 2022-08-19 ENCOUNTER — APPOINTMENT (OUTPATIENT)
Dept: ORTHOPEDIC SURGERY | Facility: CLINIC | Age: 37
End: 2022-08-19

## 2022-08-19 VITALS — WEIGHT: 240 LBS | BODY MASS INDEX: 44.16 KG/M2 | HEIGHT: 62 IN

## 2022-08-19 DIAGNOSIS — S82.65XA NONDISPLACED FRACTURE OF LATERAL MALLEOLUS OF LEFT FIBULA, INITIAL ENCOUNTER FOR CLOSED FRACTURE: ICD-10-CM

## 2022-08-19 PROCEDURE — 99213 OFFICE O/P EST LOW 20 MIN: CPT

## 2022-08-19 PROCEDURE — 73610 X-RAY EXAM OF ANKLE: CPT | Mod: LT

## 2022-08-19 RX ORDER — TRAMADOL HYDROCHLORIDE 50 MG/1
50 TABLET, COATED ORAL
Qty: 14 | Refills: 0 | Status: ACTIVE | COMMUNITY
Start: 2022-08-19 | End: 1900-01-01

## 2022-08-19 RX ORDER — DICLOFENAC SODIUM 1% 10 MG/G
1 GEL TOPICAL
Qty: 1 | Refills: 0 | Status: ACTIVE | COMMUNITY
Start: 2022-08-19 | End: 1900-01-01

## 2022-08-19 NOTE — IMAGING
[de-identified] : On examination of her left ankle she has mild swelling, no erythema, no ecchymosis.  She is tender over the lateral malleolus and the ATFL.  She is nontender over medial malleolus or deltoid.  She is nontender over the talar dome.  She has mild tenderness over the anterior tibia.  She is nontender over the Achilles, no tenderness over the calcaneus.  Negative Wang's test, no calf tenderness.    Calf is soft.  She has no point tenderness palpation of the foot.  She is able to dorsiflex and plantar flex, sensation is intact throughout, 2+ DP and PT pulses.\par \par  X-rays repeated in the office today of the left ankle, weight-bearing, show nondisplaced lateral malleolus fracture.  Alignment is unchanged from the previous x-rays, there is slightly more callus formation.

## 2022-08-19 NOTE — DISCUSSION/SUMMARY
[de-identified] :   At this time I recommend she continue to wear the Cam walker boot for another 1-2 weeks. She can continue to weightbear as tolerated at this point.  Rest, ice, elevate.   she states the other medication bother her stomach she is requesting something else for pain, I will send a script for Voltaren gel to the pharmacy and some pain medication to take only as needed.  She was advised to make her drowsy.  I will see her back in 4-6 weeks for repeat x-rays and evaluation.  Still no activity. Patient will call me if any other problems or concerns.  Patient verbalized understanding and agreed with the plan, all questions were answered in the office today.\par

## 2022-08-19 NOTE — HISTORY OF PRESENT ILLNESS
[de-identified] :  36-year-old female is here today for follow-up evaluation of her left  lateral malleolus fracture.  Patient injured the ankle about 3 weeks ago.  She was seen in the walk-in, I placed her in a CAM walker boot and recommend she get a knee walker.  She was having difficulty using crutches.  She never about the knee walker she has been weight-bearing as tolerated in the boot, however she came in the office today wearing a regular shoe.  She states she is feeling better but is still having pain in the ankle.  She denies any new injury or trauma.  She denies any numbness tingling in the foot or any calf pain.

## 2022-08-24 ENCOUNTER — APPOINTMENT (OUTPATIENT)
Dept: OBGYN | Facility: CLINIC | Age: 37
End: 2022-08-24

## 2022-09-23 ENCOUNTER — APPOINTMENT (OUTPATIENT)
Dept: ORTHOPEDIC SURGERY | Facility: CLINIC | Age: 37
End: 2022-09-23

## 2022-10-21 ENCOUNTER — NON-APPOINTMENT (OUTPATIENT)
Age: 37
End: 2022-10-21

## 2022-10-21 ENCOUNTER — LABORATORY RESULT (OUTPATIENT)
Age: 37
End: 2022-10-21

## 2022-10-21 ENCOUNTER — APPOINTMENT (OUTPATIENT)
Dept: OBGYN | Facility: CLINIC | Age: 37
End: 2022-10-21

## 2022-10-21 VITALS
BODY MASS INDEX: 45.08 KG/M2 | WEIGHT: 245 LBS | SYSTOLIC BLOOD PRESSURE: 146 MMHG | HEIGHT: 62 IN | DIASTOLIC BLOOD PRESSURE: 90 MMHG

## 2022-10-21 PROCEDURE — 99395 PREV VISIT EST AGE 18-39: CPT

## 2022-10-26 LAB
A VAGINAE DNA VAG QL NAA+PROBE: ABNORMAL
BVAB2 DNA VAG QL NAA+PROBE: ABNORMAL
C KRUSEI DNA VAG QL NAA+PROBE: NEGATIVE
C TRACH RRNA SPEC QL NAA+PROBE: NEGATIVE
MEGA1 DNA VAG QL NAA+PROBE: ABNORMAL
N GONORRHOEA RRNA SPEC QL NAA+PROBE: NEGATIVE
T VAGINALIS RRNA SPEC QL NAA+PROBE: NEGATIVE

## 2022-10-26 RX ORDER — METRONIDAZOLE 7.5 MG/G
0.75 GEL VAGINAL
Qty: 1 | Refills: 1 | Status: ACTIVE | COMMUNITY
Start: 2022-10-26 | End: 1900-01-01

## 2022-10-27 LAB — HPV HIGH+LOW RISK DNA PNL CVX: DETECTED

## 2022-11-09 LAB — CYTOLOGY CVX/VAG DOC THIN PREP: NORMAL

## 2022-11-14 ENCOUNTER — APPOINTMENT (OUTPATIENT)
Dept: OBGYN | Facility: CLINIC | Age: 37
End: 2022-11-14

## 2022-11-22 RX ORDER — FLUCONAZOLE 150 MG/1
150 TABLET ORAL
Qty: 1 | Refills: 3 | Status: ACTIVE | COMMUNITY
Start: 2022-11-22 | End: 1900-01-01

## 2022-12-01 ENCOUNTER — APPOINTMENT (OUTPATIENT)
Dept: OBGYN | Facility: CLINIC | Age: 37
End: 2022-12-01

## 2022-12-01 VITALS — WEIGHT: 245 LBS | HEIGHT: 62 IN | BODY MASS INDEX: 45.08 KG/M2

## 2022-12-01 PROCEDURE — 99213 OFFICE O/P EST LOW 20 MIN: CPT

## 2022-12-01 RX ORDER — TERCONAZOLE 8 MG/G
0.8 CREAM VAGINAL
Qty: 1 | Refills: 1 | Status: ACTIVE | COMMUNITY
Start: 2022-12-01 | End: 1900-01-01

## 2022-12-01 RX ORDER — FLUCONAZOLE 150 MG/1
150 TABLET ORAL
Qty: 1 | Refills: 3 | Status: ACTIVE | COMMUNITY
Start: 2022-12-01 | End: 1900-01-01

## 2022-12-29 DIAGNOSIS — N76.0 ACUTE VAGINITIS: ICD-10-CM

## 2022-12-29 DIAGNOSIS — B96.89 ACUTE VAGINITIS: ICD-10-CM

## 2022-12-29 RX ORDER — METRONIDAZOLE 7.5 MG/G
0.75 GEL VAGINAL
Qty: 1 | Refills: 1 | Status: ACTIVE | COMMUNITY
Start: 2022-12-29 | End: 1900-01-01

## 2023-04-11 RX ORDER — FLUCONAZOLE 150 MG/1
150 TABLET ORAL
Qty: 1 | Refills: 1 | Status: ACTIVE | COMMUNITY
Start: 2023-04-11 | End: 1900-01-01

## 2023-04-12 RX ORDER — TERCONAZOLE 8 MG/G
0.8 CREAM VAGINAL
Qty: 1 | Refills: 1 | Status: ACTIVE | COMMUNITY
Start: 2023-04-12 | End: 1900-01-01

## 2023-04-15 ENCOUNTER — EMERGENCY (EMERGENCY)
Facility: HOSPITAL | Age: 38
LOS: 0 days | Discharge: HOME | End: 2023-04-15
Attending: EMERGENCY MEDICINE
Payer: MEDICAID

## 2023-04-15 VITALS
HEART RATE: 76 BPM | RESPIRATION RATE: 18 BRPM | DIASTOLIC BLOOD PRESSURE: 81 MMHG | HEIGHT: 62 IN | SYSTOLIC BLOOD PRESSURE: 142 MMHG | TEMPERATURE: 98 F | OXYGEN SATURATION: 99 %

## 2023-04-15 DIAGNOSIS — Z98.890 OTHER SPECIFIED POSTPROCEDURAL STATES: Chronic | ICD-10-CM

## 2023-04-15 DIAGNOSIS — L29.2 PRURITUS VULVAE: ICD-10-CM

## 2023-04-15 DIAGNOSIS — Z98.891 HISTORY OF UTERINE SCAR FROM PREVIOUS SURGERY: Chronic | ICD-10-CM

## 2023-04-15 DIAGNOSIS — N89.8 OTHER SPECIFIED NONINFLAMMATORY DISORDERS OF VAGINA: ICD-10-CM

## 2023-04-15 DIAGNOSIS — Z98.84 BARIATRIC SURGERY STATUS: Chronic | ICD-10-CM

## 2023-04-15 DIAGNOSIS — Z98.82 BREAST IMPLANT STATUS: Chronic | ICD-10-CM

## 2023-04-15 LAB
APPEARANCE UR: CLEAR — SIGNIFICANT CHANGE UP
BILIRUB UR-MCNC: NEGATIVE — SIGNIFICANT CHANGE UP
COLOR SPEC: SIGNIFICANT CHANGE UP
DIFF PNL FLD: NEGATIVE — SIGNIFICANT CHANGE UP
GLUCOSE UR QL: NEGATIVE — SIGNIFICANT CHANGE UP
KETONES UR-MCNC: NEGATIVE — SIGNIFICANT CHANGE UP
LEUKOCYTE ESTERASE UR-ACNC: NEGATIVE — SIGNIFICANT CHANGE UP
NITRITE UR-MCNC: NEGATIVE — SIGNIFICANT CHANGE UP
PH UR: 6 — SIGNIFICANT CHANGE UP (ref 5–8)
PROT UR-MCNC: NEGATIVE — SIGNIFICANT CHANGE UP
SP GR SPEC: 1.01 — SIGNIFICANT CHANGE UP (ref 1.01–1.03)
UROBILINOGEN FLD QL: SIGNIFICANT CHANGE UP

## 2023-04-15 PROCEDURE — 99284 EMERGENCY DEPT VISIT MOD MDM: CPT

## 2023-04-15 PROCEDURE — 87801 DETECT AGNT MULT DNA AMPLI: CPT

## 2023-04-15 PROCEDURE — 87086 URINE CULTURE/COLONY COUNT: CPT

## 2023-04-15 PROCEDURE — 87798 DETECT AGENT NOS DNA AMP: CPT

## 2023-04-15 PROCEDURE — 87661 TRICHOMONAS VAGINALIS AMPLIF: CPT

## 2023-04-15 PROCEDURE — 81003 URINALYSIS AUTO W/O SCOPE: CPT

## 2023-04-15 PROCEDURE — 87591 N.GONORRHOEAE DNA AMP PROB: CPT

## 2023-04-15 PROCEDURE — 87491 CHLMYD TRACH DNA AMP PROBE: CPT

## 2023-04-15 RX ORDER — METRONIDAZOLE 500 MG
1 TABLET ORAL
Qty: 14 | Refills: 0
Start: 2023-04-15 | End: 2023-04-21

## 2023-04-15 NOTE — ED PROVIDER NOTE - PATIENT PORTAL LINK FT
You can access the FollowMyHealth Patient Portal offered by [facility] by registering at the following website: [email or web address]. By joining [employer]’s FollowMyHealth portal, you will also be able to view your health information using other applications (apps) compatible with our system.

## 2023-04-15 NOTE — ED PROVIDER NOTE - NS ED ATTENDING STATEMENT MOD
I have seen and examined this patient and fully participated in the care of this patient as the teaching attending.  The service was shared with the LILIANA.  I reviewed and verified the documentation and independently performed the documented:

## 2023-04-15 NOTE — ED PROVIDER NOTE - ATTENDING SHARED VISIT SELECTORS
[Please see my note below.] : Please see my note below. [Sincerely,] : Sincerely, History/Exam/Medical Decision Making

## 2023-04-15 NOTE — ED PROVIDER NOTE - OBJECTIVE STATEMENT
37 year old female with pmhx noted presents to ed with vaginal discharge and itching x 2 week. mild dysuria. pt was prescribed diflucan 1 dose by her MD over the phone but did not help. no abd pain or flank pain, nausea, vomiting, diarrhea, or hematuria. sexually active with , no concern for stds

## 2023-04-15 NOTE — ED ADULT TRIAGE NOTE - CHIEF COMPLAINT QUOTE
"I have an itching in my vagina and I was using pills but they're not helping and the back of my right knee is hurting"

## 2023-04-15 NOTE — ED PROVIDER NOTE - PHYSICAL EXAMINATION
CONST: Well appearing in NAD  EYES: PERRL, EOMI, Sclera and conjunctiva clear.   ENT: No nasal discharge. Oropharynx normal appearing  NECK: Non-tender, no meningeal signs. normal ROM. supple   CARD: Normal S1 S2; Normal rate and rhythm  RESP: Equal BS B/L, No wheezes, rhonchi or rales. No distress  GI: Soft, non-tender, non-distended. no cva tenderness. normal BS  GYN: + thin white discharge, no curd like discharge. no cmt or adnexal tenderness  MS: Normal ROM in all extremities. No midline spinal tenderness. pulses 2 +. no calf tenderness or swelling  SKIN: Warm, dry, no acute rashes. Good turgor  NEURO: A&Ox3, No focal deficits.

## 2023-04-15 NOTE — ED PROVIDER NOTE - ATTENDING CONTRIBUTION TO CARE
37-year-old female no past medical history presents with 9 days of vaginal itching dysuria.  Patient called her OB/GYN who gave her Diflucan 2 pills and MetroGel with no relief.  No fever or chills.  No vaginal bleeding or discharge.  No abdominal pain nausea vomiting diarrhea constipation.  No hematuria frequency.  No flank pain.    On exam, AFVSS, Well appearing, No acute distress, NCAT, EOMI, PERRLA, MMM, Neck supple, LCTAB, RRR nl s1s2 No mrg, Abdomen Soft NTND, no CVA tenderness,  exam agree with URI Reyes, AAOx3, No Focal Deficits, No LE edema or calf TTP,    A/P; vaginal itching, vaginal swab sent for testing, urine pregnancy test negative, UA negative, DC home with Flagyl for possible BV and OB/GYN follow-up as outpatient, strict return precautions

## 2023-04-15 NOTE — ED PROVIDER NOTE - NSFOLLOWUPINSTRUCTIONS_ED_ALL_ED_FT
Bacterial Vaginosis    Bacterial vaginosis is an infection of the vagina. It happens when too many normal germs (healthy bacteria) grow in the vagina. This infection can make it easier to get other infections from sex (STIs).    It is very important for pregnant women to get treated. This infection can cause babies to be born early or at a low birth weight.    What are the causes?  This infection is caused by an increase in certain germs that grow in the vagina.    You cannot get this infection from toilet seats, bedsheets, swimming pools, or things that touch your vagina.    What increases the risk?  Having sex with a new person or more than one person.  Having sex without protection.  Douching.  Having an intrauterine device (IUD).  Smoking.  Using drugs or drinking alcohol. These can lead you to do things that are risky.  Taking certain antibiotic medicines.  Being pregnant.  What are the signs or symptoms?  Some women have no symptoms. Symptoms may include:  A discharge from your vagina. It may be gray or white. It can be watery or foamy.  A fishy smell. This can happen after sex or during your menstrual period.  Itching in and around your vagina.  A feeling of burning or pain when you pee (urinate).  How is this treated?  This infection is treated with antibiotic medicines. These may be given to you as:  A pill.  A cream for your vagina.  A medicine that you put into your vagina (suppository).  If the infection comes back after treatment, you may need more antibiotics.    Follow these instructions at home:  Medicines    Take over-the-counter and prescription medicines as told by your doctor.  Take or use your antibiotic medicine as told by your doctor. Do not stop taking or using it, even if you start to feel better.  General instructions    If the person you have sex with is a woman, tell her that you have this infection. She will need to follow up with her doctor. If you have a male partner, he does not need to be treated.  Do not have sex until you finish treatment.  Drink enough fluid to keep your pee pale yellow.  Keep your vagina and butt clean.  Wash the area with warm water each day.  Wipe from front to back after you use the toilet.  If you are breastfeeding a baby, ask your doctor if you should keep doing so during treatment.  Keep all follow-up visits.  How is this prevented?  Self-care    Do not douche.  Use only warm water to wash around your vagina.  Wear underwear that is cotton or lined with cotton.  Do not wear tight pants and pantyhose, especially in the summer.  Safe sex    Use protection when you have sex. This includes:  Use condoms.  Use dental dams. This is a thin layer that protects the mouth during oral sex.  Limit how many people you have sex with. To prevent this infection, it is best to have sex with just one person.  Get tested for STIs. The person you have sex with should also get tested.  Drugs and alcohol    Do not smoke or use any products that contain nicotine or tobacco. If you need help quitting, ask your doctor.  Do not use drugs.  Do not drink alcohol if:  Your doctor tells you not to drink.  You are pregnant, may be pregnant, or are planning to become pregnant.  If you drink alcohol:  Limit how much you have to 0–1 drink a day.  Know how much alcohol is in your drink. In the [place] one drink equals one 12 oz bottle of beer (355 mL), one 5 oz glass of wine (148 mL), or one 1½ oz glass of hard liquor (44 mL).  Where to find more information  Centers for Disease Control and Prevention: www.cdc.gov  American Sexual Health Association: www.ashastd.org  Office on Women's Health: www.womenshealth.gov  Contact a doctor if:  Your symptoms do not get better, even after you are treated.  You have more discharge or pain when you pee.  You have a fever or chills.  You have pain in your belly (abdomen) or in the area between your hips.  You have pain with sex.  You bleed from your vagina between menstrual periods.  Summary  This infection can happen when too many germs (bacteria) grow in the vagina.  This infection can make it easier to get infections from sex (STIs). Treating this can lower that chance.  Get treated if you are pregnant. This infection can cause babies to be born early.  Do not stop taking or using your antibiotic medicine, even if you start to feel better.  This information is not intended to replace advice given to you by your health care provider. Make sure you discuss any questions you have with your health care provider.

## 2023-04-16 LAB
CULTURE RESULTS: SIGNIFICANT CHANGE UP
SPECIMEN SOURCE: SIGNIFICANT CHANGE UP

## 2023-04-19 LAB
A VAGINAE DNA VAG QL NAA+PROBE: SIGNIFICANT CHANGE UP
BVAB2 DNA VAG QL NAA+PROBE: SIGNIFICANT CHANGE UP
C ALBICANS DNA VAG QL NAA+PROBE: NEGATIVE — SIGNIFICANT CHANGE UP
C GLABRATA DNA VAG QL NAA+PROBE: NEGATIVE — SIGNIFICANT CHANGE UP
C KRUSEI DNA VAG QL NAA+PROBE: NEGATIVE — SIGNIFICANT CHANGE UP
C LUSITANIAE DNA VAG QL NAA+PROBE: NEGATIVE — SIGNIFICANT CHANGE UP
C TRACH RRNA SPEC QL NAA+PROBE: NEGATIVE — SIGNIFICANT CHANGE UP
MEGA1 DNA VAG QL NAA+PROBE: SIGNIFICANT CHANGE UP
N GONORRHOEA RRNA SPEC QL NAA+PROBE: NEGATIVE — SIGNIFICANT CHANGE UP
T VAGINALIS RRNA SPEC QL NAA+PROBE: NEGATIVE — SIGNIFICANT CHANGE UP

## 2023-06-01 NOTE — ASU PATIENT PROFILE, ADULT - DATE/TIME OF ACCEPTANCE
Met with patient at bedside to discuss d/c planning and home infusion services.  Awaiting plastics decision for wound care post transition home.  Spoke with IR re: anticipated transition home 6/2 and order for midline placement.  Pt in agreement with home infusion, he states his spouse will be able to perform the procedure. 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  He is in agreement for CM to send to vendors pending insurance verification and possible copay.  He is aware a CM will remain available through hospitalization.   30-Sep-2020 09:44

## 2023-06-04 ENCOUNTER — EMERGENCY (EMERGENCY)
Facility: HOSPITAL | Age: 38
LOS: 0 days | Discharge: HOME | End: 2023-06-05
Attending: EMERGENCY MEDICINE
Payer: MEDICAID

## 2023-06-04 VITALS
HEIGHT: 62 IN | DIASTOLIC BLOOD PRESSURE: 82 MMHG | RESPIRATION RATE: 20 BRPM | WEIGHT: 244.93 LBS | OXYGEN SATURATION: 100 % | SYSTOLIC BLOOD PRESSURE: 130 MMHG | HEART RATE: 90 BPM | TEMPERATURE: 99 F

## 2023-06-04 DIAGNOSIS — E03.9 HYPOTHYROIDISM, UNSPECIFIED: ICD-10-CM

## 2023-06-04 DIAGNOSIS — R10.12 LEFT UPPER QUADRANT PAIN: ICD-10-CM

## 2023-06-04 DIAGNOSIS — R11.0 NAUSEA: ICD-10-CM

## 2023-06-04 DIAGNOSIS — E78.5 HYPERLIPIDEMIA, UNSPECIFIED: ICD-10-CM

## 2023-06-04 DIAGNOSIS — Z98.890 OTHER SPECIFIED POSTPROCEDURAL STATES: Chronic | ICD-10-CM

## 2023-06-04 DIAGNOSIS — Z98.891 HISTORY OF UTERINE SCAR FROM PREVIOUS SURGERY: Chronic | ICD-10-CM

## 2023-06-04 DIAGNOSIS — Z98.84 BARIATRIC SURGERY STATUS: ICD-10-CM

## 2023-06-04 DIAGNOSIS — Z87.59 PERSONAL HISTORY OF OTHER COMPLICATIONS OF PREGNANCY, CHILDBIRTH AND THE PUERPERIUM: ICD-10-CM

## 2023-06-04 DIAGNOSIS — Z98.82 BREAST IMPLANT STATUS: Chronic | ICD-10-CM

## 2023-06-04 DIAGNOSIS — R10.32 LEFT LOWER QUADRANT PAIN: ICD-10-CM

## 2023-06-04 DIAGNOSIS — Z90.49 ACQUIRED ABSENCE OF OTHER SPECIFIED PARTS OF DIGESTIVE TRACT: ICD-10-CM

## 2023-06-04 DIAGNOSIS — Z98.82 BREAST IMPLANT STATUS: ICD-10-CM

## 2023-06-04 DIAGNOSIS — Z98.84 BARIATRIC SURGERY STATUS: Chronic | ICD-10-CM

## 2023-06-04 PROCEDURE — 87086 URINE CULTURE/COLONY COUNT: CPT

## 2023-06-04 PROCEDURE — 83690 ASSAY OF LIPASE: CPT

## 2023-06-04 PROCEDURE — 99285 EMERGENCY DEPT VISIT HI MDM: CPT

## 2023-06-04 PROCEDURE — 96374 THER/PROPH/DIAG INJ IV PUSH: CPT | Mod: XU

## 2023-06-04 PROCEDURE — 74177 CT ABD & PELVIS W/CONTRAST: CPT | Mod: MA

## 2023-06-04 PROCEDURE — 96375 TX/PRO/DX INJ NEW DRUG ADDON: CPT

## 2023-06-04 PROCEDURE — 80076 HEPATIC FUNCTION PANEL: CPT

## 2023-06-04 PROCEDURE — 85025 COMPLETE CBC W/AUTO DIFF WBC: CPT

## 2023-06-04 PROCEDURE — 84703 CHORIONIC GONADOTROPIN ASSAY: CPT

## 2023-06-04 PROCEDURE — 81003 URINALYSIS AUTO W/O SCOPE: CPT

## 2023-06-04 PROCEDURE — 36415 COLL VENOUS BLD VENIPUNCTURE: CPT

## 2023-06-04 PROCEDURE — 80048 BASIC METABOLIC PNL TOTAL CA: CPT

## 2023-06-04 PROCEDURE — 99284 EMERGENCY DEPT VISIT MOD MDM: CPT | Mod: 25

## 2023-06-04 RX ORDER — DIATRIZOATE MEGLUMINE 180 MG/ML
30 INJECTION, SOLUTION INTRAVESICAL ONCE
Refills: 0 | Status: COMPLETED | OUTPATIENT
Start: 2023-06-04 | End: 2023-06-05

## 2023-06-04 RX ORDER — ONDANSETRON 8 MG/1
4 TABLET, FILM COATED ORAL ONCE
Refills: 0 | Status: COMPLETED | OUTPATIENT
Start: 2023-06-04 | End: 2023-06-05

## 2023-06-04 RX ORDER — MORPHINE SULFATE 50 MG/1
4 CAPSULE, EXTENDED RELEASE ORAL ONCE
Refills: 0 | Status: DISCONTINUED | OUTPATIENT
Start: 2023-06-04 | End: 2023-06-04

## 2023-06-04 RX ORDER — SODIUM CHLORIDE 9 MG/ML
1000 INJECTION INTRAMUSCULAR; INTRAVENOUS; SUBCUTANEOUS ONCE
Refills: 0 | Status: COMPLETED | OUTPATIENT
Start: 2023-06-04 | End: 2023-06-04

## 2023-06-04 NOTE — ED ADULT NURSE NOTE - NSFALLUNIVINTERV_ED_ALL_ED
Bed/Stretcher in lowest position, wheels locked, appropriate side rails in place/Call bell, personal items and telephone in reach/Instruct patient to call for assistance before getting out of bed/chair/stretcher/Non-slip footwear applied when patient is off stretcher/[place] to call system/Physically safe environment - no spills, clutter or unnecessary equipment/Purposeful proactive rounding/Room/bathroom lighting operational, light cord in reach

## 2023-06-04 NOTE — ED ADULT TRIAGE NOTE - BMI (KG/M2)
Rapid Response Team Note    Patient Details:  Date: 8/17/2022   Gage Noel  68 year old male  [id number]  [birth year]      RRT Paged:     Reason for RRT: chest discomfort, dyspnea    Brief HPI:  68-year-old male with history of CAD s/p PCI LAD 7/15/2022 and PCI RCA 7/18/2022, HFpEF EF 52%, HTN, HLD, DM2 initially presented to the ED 8/17/2022 for midsternal chest pain.  Patient woke up with chest pain that improved with nitro temporarily and subsequently came to the ED when his chest pain recurred.  Patient has been on aspirin and Plavix consistently.  During hospital course had increasing oxygen requirements along with elevated troponin last 1 being 3000 up trended from 28 and was subsequently placed on heparin.  Patient received aspirin prior to arrival and Plavix during admission today.  RRT called for increased work of breathing despite being on nonrebreather.  On arrival patient with increased work of breathing using accessory muscles, comfortable, SPO2 85%, temp 37.8, heart rate 99, /82, RR 25.  Patient reported discomfort in his chest along with worsening dyspnea over the past hour.  During the daytime patient was on 6 L nasal cannula but modified to nonrebreather when dyspnea progressed.    Current Medications:  Current Facility-Administered Medications   Medication   • aspirin chewable 81 mg   • atorvastatin (LIPITOR) tablet 40 mg   • clopidogrel (PLAVIX) tablet 75 mg   • heparin (porcine) 25,000 units/250 mL in dextrose 5 % infusion   • heparin (porcine) injection 4,000 Units   • heparin (porcine) injection 2,000 Units   • furosemide (LASIX INJECT) injection 40 mg   • dextrose 50 % injection 25 g   • dextrose 50 % injection 12.5 g   • glucagon (GLUCAGEN) injection 1 mg   • dextrose (GLUTOSE) 40 % gel 15 g   • dextrose (GLUTOSE) 40 % gel 30 g   • insulin lispro (ADMELOG,HumaLOG) - Correction Dose   • insulin lispro (ADMELOG,HumaLOG) - Correction Dose   • [START ON 8/18/2022] allopurinol (ZYLOPRIM)  tablet 300 mg   • ascorbic acid (VITAMIN C) tablet 500 mg   • cholecalciferol (VITAMIN D) tablet 25 mcg   • [START ON 8/18/2022] pantoprazole (PROTONIX) EC tablet 40 mg   • tamsulosin (FLOMAX) capsule 0.4 mg   • sodium chloride 0.9 % flush bag 25 mL   • sodium chloride (PF) 0.9 % injection 2 mL   • nitroGLYcerin (NITROSTAT) sublingual tablet 0.4 mg   • sodium chloride 0.9% infusion   • NITROGLYCERIN 0.4 MG SL SUBL Pyxis Override   • nitroGLYcerin 50 mg in dextrose 5% 250 mL infusion   • furosemide (LASIX INJECT) injection 80 mg       Vitals:  Vital Last Value 24 Hour Range   Temperature 100 °F (37.8 °C) Temp  Min: 98.8 °F (37.1 °C)  Max: 100 °F (37.8 °C)   Pulse 97 Pulse  Min: 67  Max: 100   Respiratory 18 Resp  Min: 16  Max: 33   Blood Pressure (!) 144/82 BP  Min: 122/55  Max: 152/85   Pulse Oximetry 97 % SpO2  Min: 87 %  Max: 100 %   CVP   No data recorded     Vital Today Admit   Weight 121.8 kg (268 lb 8.3 oz) Weight: 121.8 kg (268 lb 8.3 oz)   Height N/A Height: 6' 1\" (185.4 cm)   BMI N/A BMI (Calculated): 35.43     Physical Exam:  General: Initially in moderate distress laying in bed  Musculoskeletal: Cool extremities and well perfused. No cyanosis or edema.  Cardiovascular: Normal rate and regular rhythm, S1/S2 without additional heartsounds.   Peripheral pulses are 2+ and symmetric.  Pulmonary: Initially bilateral crackles in the bases, on nonrebreather initially, and using accessory muscles along with increased work of breathing.  After 1 hour of BiPAP and transition to HFNC breathing more comfortably without accessory muscles.  Abdomen: Soft, non-tender and non-distended. Bowel sounds are normoactive. No guarding or rebound tenderness. No hepatosplenomegaly  Neurological: CNs II-XII are intact.  Strength, sensation, and coordination of RUE/RLE/LUE/LLL are grossly intact. Alert and oriented x4.    Laboratory Results:  Recent Labs   Lab 08/17/22  1826 08/17/22  1422 08/17/22  0927   WBC  --  3.3* 2.7*   HCT   --  25.5* 27.9*   HGB  --  7.5* 8.4*   PLT  --  49* 51*   INR 1.1  --  1.0   SODIUM  --   --  138   POTASSIUM  --   --  4.4   CHLORIDE  --   --  109   CO2  --   --  22   GLUCOSE  --   --  214*   BUN  --   --  16   CREATININE  --   --  1.06   CALCIUM  --   --  8.9   ALBUMIN  --   --  3.5*   MG  --   --  1.7   BILIRUBIN  --   --  1.7*   ALKPT  --   --  119*   AST  --   --  9   GPT  --   --  17       Radiology:  XR CHEST PA OR AP 1 VIEW    Result Date: 8/17/2022  EXAM: XR CHEST PA OR AP 1 VIEW CLINICAL INDICATION: Shortness of breath.  Chest pain. TECHNIQUE: AP upright view of chest obtained using portable technique. COMPARISON: Chest radiograph on 07/13/2022. FINDINGS: The cardiomediastinal and hilar contours are stable.  The pulmonary vasculature is unremarkable.  There is no pneumothorax, pleural effusion, or focal airspace consolidation.  The visualized bones are unremarkable. There is overall stable nonspecific mild bilateral interstitial prominence.  Evaluation of lower chest is partially limited on single view.     1.   Overall unchanged mild bilateral interstitial prominence since 07/13/2022, of uncertain chronicity, possibly representing edema and/or interstitial lung disease. 2.   No new radiographic abnormality. Electronically Signed by: GWENDOLYN ANDERSON MD Signed on: 8/17/2022 10:37 AM           Assessment and Plan:  68-year-old male with history of CAD s/p PCI LAD 7/15/2022 and PCI RCA 7/18/2022, HFpEF EF 52%, HTN, HLD, DM2 initially presented to the ED 8/17/2022 for midsternal chest pain.  RRT for progressive hypoxic respiratory failure, chest discomfort.    Acute hypoxic respiratory failure  NSTEMI  CAD with recent PCI  HFpEF exacerbation EF 52%  Chest discomfort  - NSTEMI with rising trop, chest discomfort, progressive heart failure initially requiring BiPAP to oxygenate  - Goal SPO2 greater than 92%  - After 1 hour of BiPAP transitioned to high flow nasal cannula which patient tolerated, will continue HFNC  for now as long as breathing and oxygennation continue to be maintained  - Cardiology fellow evaluated at bedside as well and team is deferring cath for now given extensiveness of disease, will continue to reevaluate if patient's clinical condition declines  - Continue nitro drip, heparin drip, s/p aspirin, continue DAPT, 80 lasix given in addition to his 40 lasix during the day  - Given extensive fluid overload picture we will hold off on additional PRBC for now, will reevaluate in a few hours after diuresed per cardiology and likely can receive PRBC with Lasix      - Disposition: F  - Counseling: Provided to patient and family  - Discussed with Dr. Garcia team, RRT RN, floor RN    Baldo Carlson MD  MOD  Can be reached via [employer]    --------------  Another RRT 2311 for progressive hypoxia, work of breathing, requiring BIPAP. Transferred to CCU for bipap, lasix gtt, and higher level of care. Discussed with cardiology team, family, RRT RN, floor RN.    Baldo Carlson MD  MOD  Can be reached via [email or web address]     44.8

## 2023-06-04 NOTE — ED ADULT NURSE NOTE - TEMPLATE
HPI:    Patient ID: Riddhi Weston is a 47year old female. This pleasant 65-year-old female presents as a new patient to me on consult from [address] [place] [address]. Patient's primary complaint is pain associated with both great toes.   She states it is been a con hallux limitus. X-ray today does confirm the diagnosis. I described and reviewed the etiology, progression, and the options of care for this condition. I described a joint remodeling cheilectomy type procedure.   I reviewed the procedure, postoperative c Abdominal Pain, N/V/D no

## 2023-06-05 LAB
ALBUMIN SERPL ELPH-MCNC: 3.9 G/DL — SIGNIFICANT CHANGE UP (ref 3.5–5.2)
ALP SERPL-CCNC: 108 U/L — SIGNIFICANT CHANGE UP (ref 30–115)
ALT FLD-CCNC: 17 U/L — SIGNIFICANT CHANGE UP (ref 0–41)
ANION GAP SERPL CALC-SCNC: 11 MMOL/L — SIGNIFICANT CHANGE UP (ref 7–14)
APPEARANCE UR: CLEAR — SIGNIFICANT CHANGE UP
AST SERPL-CCNC: 27 U/L — SIGNIFICANT CHANGE UP (ref 0–41)
BASOPHILS # BLD AUTO: 0.03 K/UL — SIGNIFICANT CHANGE UP (ref 0–0.2)
BASOPHILS NFR BLD AUTO: 0.2 % — SIGNIFICANT CHANGE UP (ref 0–1)
BILIRUB DIRECT SERPL-MCNC: <0.2 MG/DL — SIGNIFICANT CHANGE UP (ref 0–0.3)
BILIRUB INDIRECT FLD-MCNC: >0 MG/DL — LOW (ref 0.2–1.2)
BILIRUB SERPL-MCNC: 0.2 MG/DL — SIGNIFICANT CHANGE UP (ref 0.2–1.2)
BILIRUB UR-MCNC: NEGATIVE — SIGNIFICANT CHANGE UP
BUN SERPL-MCNC: 14 MG/DL — SIGNIFICANT CHANGE UP (ref 10–20)
CALCIUM SERPL-MCNC: 8.8 MG/DL — SIGNIFICANT CHANGE UP (ref 8.4–10.5)
CHLORIDE SERPL-SCNC: 104 MMOL/L — SIGNIFICANT CHANGE UP (ref 98–110)
CO2 SERPL-SCNC: 23 MMOL/L — SIGNIFICANT CHANGE UP (ref 17–32)
COLOR SPEC: YELLOW — SIGNIFICANT CHANGE UP
CREAT SERPL-MCNC: 0.7 MG/DL — SIGNIFICANT CHANGE UP (ref 0.7–1.5)
DIFF PNL FLD: NEGATIVE — SIGNIFICANT CHANGE UP
EGFR: 113 ML/MIN/1.73M2 — SIGNIFICANT CHANGE UP
EOSINOPHIL # BLD AUTO: 0.39 K/UL — SIGNIFICANT CHANGE UP (ref 0–0.7)
EOSINOPHIL NFR BLD AUTO: 2.3 % — SIGNIFICANT CHANGE UP (ref 0–8)
GLUCOSE SERPL-MCNC: 87 MG/DL — SIGNIFICANT CHANGE UP (ref 70–99)
GLUCOSE UR QL: NEGATIVE — SIGNIFICANT CHANGE UP
HCG SERPL QL: NEGATIVE — SIGNIFICANT CHANGE UP
HCT VFR BLD CALC: 33.9 % — LOW (ref 37–47)
HGB BLD-MCNC: 10.5 G/DL — LOW (ref 12–16)
IMM GRANULOCYTES NFR BLD AUTO: 0.5 % — HIGH (ref 0.1–0.3)
KETONES UR-MCNC: NEGATIVE — SIGNIFICANT CHANGE UP
LEUKOCYTE ESTERASE UR-ACNC: NEGATIVE — SIGNIFICANT CHANGE UP
LIDOCAIN IGE QN: 32 U/L — SIGNIFICANT CHANGE UP (ref 7–60)
LYMPHOCYTES # BLD AUTO: 15.9 % — LOW (ref 20.5–51.1)
LYMPHOCYTES # BLD AUTO: 2.65 K/UL — SIGNIFICANT CHANGE UP (ref 1.2–3.4)
MCHC RBC-ENTMCNC: 24.3 PG — LOW (ref 27–31)
MCHC RBC-ENTMCNC: 31 G/DL — LOW (ref 32–37)
MCV RBC AUTO: 78.5 FL — LOW (ref 81–99)
MONOCYTES # BLD AUTO: 0.99 K/UL — HIGH (ref 0.1–0.6)
MONOCYTES NFR BLD AUTO: 5.9 % — SIGNIFICANT CHANGE UP (ref 1.7–9.3)
NEUTROPHILS # BLD AUTO: 12.54 K/UL — HIGH (ref 1.4–6.5)
NEUTROPHILS NFR BLD AUTO: 75.2 % — SIGNIFICANT CHANGE UP (ref 42.2–75.2)
NITRITE UR-MCNC: NEGATIVE — SIGNIFICANT CHANGE UP
NRBC # BLD: 0 /100 WBCS — SIGNIFICANT CHANGE UP (ref 0–0)
PH UR: 6 — SIGNIFICANT CHANGE UP (ref 5–8)
PLATELET # BLD AUTO: 260 K/UL — SIGNIFICANT CHANGE UP (ref 130–400)
PMV BLD: SIGNIFICANT CHANGE UP (ref 7.4–10.4)
POTASSIUM SERPL-MCNC: 4.4 MMOL/L — SIGNIFICANT CHANGE UP (ref 3.5–5)
POTASSIUM SERPL-SCNC: 4.4 MMOL/L — SIGNIFICANT CHANGE UP (ref 3.5–5)
PROT SERPL-MCNC: 6.9 G/DL — SIGNIFICANT CHANGE UP (ref 6–8)
PROT UR-MCNC: NEGATIVE — SIGNIFICANT CHANGE UP
RBC # BLD: 4.32 M/UL — SIGNIFICANT CHANGE UP (ref 4.2–5.4)
RBC # FLD: 15.5 % — HIGH (ref 11.5–14.5)
SODIUM SERPL-SCNC: 138 MMOL/L — SIGNIFICANT CHANGE UP (ref 135–146)
SP GR SPEC: 1.02 — SIGNIFICANT CHANGE UP (ref 1.01–1.03)
UROBILINOGEN FLD QL: SIGNIFICANT CHANGE UP
WBC # BLD: 16.69 K/UL — HIGH (ref 4.8–10.8)
WBC # FLD AUTO: 16.69 K/UL — HIGH (ref 4.8–10.8)

## 2023-06-05 PROCEDURE — 74177 CT ABD & PELVIS W/CONTRAST: CPT | Mod: 26,MA

## 2023-06-05 RX ADMIN — MORPHINE SULFATE 4 MILLIGRAM(S): 50 CAPSULE, EXTENDED RELEASE ORAL at 01:15

## 2023-06-05 RX ADMIN — SODIUM CHLORIDE 1000 MILLILITER(S): 9 INJECTION INTRAMUSCULAR; INTRAVENOUS; SUBCUTANEOUS at 01:16

## 2023-06-05 RX ADMIN — ONDANSETRON 4 MILLIGRAM(S): 8 TABLET, FILM COATED ORAL at 01:15

## 2023-06-05 RX ADMIN — DIATRIZOATE MEGLUMINE 30 MILLILITER(S): 180 INJECTION, SOLUTION INTRAVESICAL at 00:30

## 2023-06-05 NOTE — ED PROVIDER NOTE - CLINICAL SUMMARY MEDICAL DECISION MAKING FREE TEXT BOX
37-year-old female presented to the ED for left lower quadrant abdominal pain.  History of gastric surgery.  Diffuse abdominal pain on palpation.  Nondistended nonperitoneal.  We obtained labs along with CT imaging with p.o. contrast.  Labs revealed leukocytosis.  CT of the abdomen unremarkable.  With no pathology noted.  On reevaluation after fluids and morphine patient is resting comfortable.  Tolerating p.o.  No vomiting.  Overall well-appearing.  Discharged home with surgical and PMD follow-up.  Patient discharge plan.  Return precautions given. 37-year-old female presented to the ED for left lower quadrant abdominal pain.  History of gastric surgery.  Diffuse abdominal pain on palpation.  Nondistended non-peritoneal.  We obtained labs along with CT imaging with p.o. contrast.  Labs revealed leukocytosis.  With no pathology noted.  On reevaluation after fluids and morphine patient is resting comfortable.  Tolerating p.o.  No vomiting.  Overall well-appearing.  CT revealed no intra-abdominal pathology.  Results conveyed to patient.  Discharged home.  Return precautions given.

## 2023-06-05 NOTE — ED PROVIDER NOTE - OBJECTIVE STATEMENT
37-year-old female past medical history hyperlipidemia, status post cholecystectomy, status post gastric sleeve 10 years ago, hypothyroidism presents to ED for left-sided abdominal pain x2 days.  Reports moderate to severe left upper quadrant pain that radiates to the left lower quadrant associated with nausea.  Denies fever, chills, vomiting, diarrhea, constipation, urinary symptoms, vaginal bleeding, vaginal discharge.  Endorses regular bowel movements.  No dark or bloody stools.

## 2023-06-05 NOTE — ED PROVIDER NOTE - DIFFERENTIAL DIAGNOSIS
Colitis, diverticulitis,Appendicitis, Obstruction Differential Diagnosis Colitis, diverticulitis, Appendicitis, Obstruction

## 2023-06-05 NOTE — ED PROVIDER NOTE - PROGRESS NOTE DETAILS
Manas: Patient updated on results with  ID #[id number].  She reports her abdominal pain has resolved at this time.  No complaints at this time. Abd soft, nontender. Pending CT final read.

## 2023-06-05 NOTE — ED PROVIDER NOTE - PATIENT PORTAL LINK FT
You can access the FollowMyHealth Patient Portal offered by [facility] by registering at the following website: [email or web address]. By joining [email or web address]’s FollowMyHealth portal, you will also be able to view your health information using other applications (apps) compatible with our system.

## 2023-06-05 NOTE — ED PROVIDER NOTE - ATTENDING CONTRIBUTION TO CARE
see mdm for attending note see mdm for attending note    37-year-old female presented to the ED for left lower quadrant abdominal pain.  History of gastric surgery.  Diffuse abdominal pain on palpation.  Nondistended non-peritoneal.  We obtained labs along with CT imaging with p.o. contrast.  Labs revealed leukocytosis.  With no pathology noted.  On reevaluation after fluids and morphine patient is resting comfortable.  Tolerating p.o.  No vomiting.  Overall well-appearing.        Pending CT reads

## 2023-06-05 NOTE — ED PROVIDER NOTE - PHYSICAL EXAMINATION
VITAL SIGNS: I have reviewed nursing notes and confirm.  CONSTITUTIONAL: well-appearing, non-toxic, NAD  SKIN: Warm dry, normal skin turgor  HEAD: NCAT  EYES: EOMI, no scleral icterus  ENT: Moist mucous membranes, normal pharynx with no erythema or exudates  NECK: Supple; non tender. Full ROM. No cervical LAD  CARD: RRR, no murmurs, rubs or gallops  RESP: clear to ausculation b/l.  No rales, rhonchi, or wheezing.  ABD: soft, + BS, +ttp LUQ/LLQ, non-distended, no rebound or guarding. No CVA tenderness  EXT: Full ROM, no bony tenderness, no pedal edema, no calf tenderness  NEURO: Awake, alert, no focal deficits. Normal gait.  PSYCH: Cooperative, appropriate.

## 2023-06-06 LAB
CULTURE RESULTS: SIGNIFICANT CHANGE UP
SPECIMEN SOURCE: SIGNIFICANT CHANGE UP

## 2023-11-28 ENCOUNTER — APPOINTMENT (OUTPATIENT)
Dept: OBGYN | Facility: CLINIC | Age: 38
End: 2023-11-28

## 2024-02-14 ENCOUNTER — EMERGENCY (EMERGENCY)
Facility: HOSPITAL | Age: 39
LOS: 0 days | Discharge: HOME | End: 2024-02-14
Attending: EMERGENCY MEDICINE
Payer: MEDICAID

## 2024-02-14 VITALS
DIASTOLIC BLOOD PRESSURE: 61 MMHG | SYSTOLIC BLOOD PRESSURE: 128 MMHG | RESPIRATION RATE: 18 BRPM | HEIGHT: 62 IN | HEART RATE: 72 BPM | OXYGEN SATURATION: 100 % | TEMPERATURE: 98 F | WEIGHT: 235.89 LBS

## 2024-02-14 DIAGNOSIS — Z98.890 OTHER SPECIFIED POSTPROCEDURAL STATES: Chronic | ICD-10-CM

## 2024-02-14 DIAGNOSIS — Z98.891 HISTORY OF UTERINE SCAR FROM PREVIOUS SURGERY: Chronic | ICD-10-CM

## 2024-02-14 DIAGNOSIS — Z98.82 BREAST IMPLANT STATUS: Chronic | ICD-10-CM

## 2024-02-14 DIAGNOSIS — Z98.84 BARIATRIC SURGERY STATUS: Chronic | ICD-10-CM

## 2024-02-14 DIAGNOSIS — L29.2 PRURITUS VULVAE: ICD-10-CM

## 2024-02-14 DIAGNOSIS — B37.31 ACUTE CANDIDIASIS OF VULVA AND VAGINA: ICD-10-CM

## 2024-02-14 DIAGNOSIS — N89.8 OTHER SPECIFIED NONINFLAMMATORY DISORDERS OF VAGINA: ICD-10-CM

## 2024-02-14 DIAGNOSIS — Z98.84 BARIATRIC SURGERY STATUS: ICD-10-CM

## 2024-02-14 LAB
APPEARANCE UR: ABNORMAL
BACTERIA # UR AUTO: ABNORMAL /HPF
BILIRUB UR-MCNC: NEGATIVE — SIGNIFICANT CHANGE UP
C TRACH RRNA SPEC QL NAA+PROBE: SIGNIFICANT CHANGE UP
CAST: 1 /LPF — SIGNIFICANT CHANGE UP (ref 0–4)
COLOR SPEC: YELLOW — SIGNIFICANT CHANGE UP
DIFF PNL FLD: ABNORMAL
GLUCOSE UR QL: NEGATIVE MG/DL — SIGNIFICANT CHANGE UP
KETONES UR-MCNC: NEGATIVE MG/DL — SIGNIFICANT CHANGE UP
LEUKOCYTE ESTERASE UR-ACNC: ABNORMAL
N GONORRHOEA RRNA SPEC QL NAA+PROBE: SIGNIFICANT CHANGE UP
NITRITE UR-MCNC: NEGATIVE — SIGNIFICANT CHANGE UP
PH UR: 6.5 — SIGNIFICANT CHANGE UP (ref 5–8)
PROT UR-MCNC: SIGNIFICANT CHANGE UP MG/DL
RBC CASTS # UR COMP ASSIST: 2 /HPF — SIGNIFICANT CHANGE UP (ref 0–4)
SP GR SPEC: 1.02 — SIGNIFICANT CHANGE UP (ref 1–1.03)
SPECIMEN SOURCE: SIGNIFICANT CHANGE UP
SQUAMOUS # UR AUTO: 18 /HPF — HIGH (ref 0–5)
UROBILINOGEN FLD QL: 1 MG/DL — SIGNIFICANT CHANGE UP (ref 0.2–1)
WBC UR QL: 76 /HPF — HIGH (ref 0–5)
YEAST-LIKE CELLS: PRESENT

## 2024-02-14 PROCEDURE — 87086 URINE CULTURE/COLONY COUNT: CPT

## 2024-02-14 PROCEDURE — 87491 CHLMYD TRACH DNA AMP PROBE: CPT

## 2024-02-14 PROCEDURE — 87591 N.GONORRHOEAE DNA AMP PROB: CPT

## 2024-02-14 PROCEDURE — 99284 EMERGENCY DEPT VISIT MOD MDM: CPT

## 2024-02-14 PROCEDURE — 81001 URINALYSIS AUTO W/SCOPE: CPT

## 2024-02-14 PROCEDURE — 82962 GLUCOSE BLOOD TEST: CPT

## 2024-02-14 PROCEDURE — 99284 EMERGENCY DEPT VISIT MOD MDM: CPT | Mod: 25

## 2024-02-14 RX ORDER — FLUCONAZOLE 150 MG/1
200 TABLET ORAL ONCE
Refills: 0 | Status: COMPLETED | OUTPATIENT
Start: 2024-02-14 | End: 2024-02-14

## 2024-02-14 RX ORDER — FLUCONAZOLE 150 MG/1
1 TABLET ORAL
Qty: 1 | Refills: 0
Start: 2024-02-14 | End: 2024-02-14

## 2024-02-14 RX ADMIN — FLUCONAZOLE 200 MILLIGRAM(S): 150 TABLET ORAL at 06:37

## 2024-02-14 NOTE — ED PROVIDER NOTE - CLINICAL SUMMARY MEDICAL DECISION MAKING FREE TEXT BOX
Exam cw vagina yeast, given dose of diflucan and rx for follow up dose, has GYN appt 2/20.    Advised on sx monitoring, return precautions, follow up.

## 2024-02-14 NOTE — ED PROVIDER NOTE - OBJECTIVE STATEMENT
39 yo F, hx of gastric sleeve here for assessment of white vaginal discharge with vaginal itching. Sx began 2/6, progressively worsening, itching kept her awake tonight. No dysuria, hematuria, new sexual partners, missed menses. No abdominal pain.

## 2024-02-14 NOTE — ED PROVIDER NOTE - PATIENT PORTAL LINK FT
You can access the FollowMyHealth Patient Portal offered by [facility] by registering at the following website: [email or web address]. By joining [employer]’s FollowMyHealth portal, you will also be able to view your health information using other applications (apps) compatible with our system.

## 2024-02-14 NOTE — ED PROVIDER NOTE - PHYSICAL EXAMINATION
VITAL SIGNS: I have reviewed nursing notes and confirm.  CONSTITUTIONAL: Well-developed; well-nourished; in no acute distress.  SKIN: Skin exam is warm and dry, no acute rash.  HEAD: Normocephalic; atraumatic.  EYES: PERRL, EOM intact; conjunctiva and sclera clear.  ENT: No nasal discharge; airway clear.   CARD: S1, S2 normal; no murmurs, gallops, or rubs. Regular rate and rhythm.  RESP: No wheezes, rales or rhonchi.  ABD: Normal bowel sounds; soft; non-distended; non-tender; no hepatosplenomegaly.  EXT: Normal ROM.   NEURO: Alert, oriented. Grossly unremarkable. No focal deficits.  PSYCH: Cooperative, appropriate. VITAL SIGNS: I have reviewed nursing notes and confirm.  CONSTITUTIONAL: Well-developed; well-nourished; in no acute distress.  SKIN: Skin exam is warm and dry, no acute rash.  HEAD: Normocephalic; atraumatic.  EYES: PERRL, EOM intact; conjunctiva and sclera clear.  ENT: No nasal discharge; airway clear.   CARD: S1, S2 normal; no murmurs, gallops, or rubs. Regular rate and rhythm.  RESP: No wheezes, rales or rhonchi.  ABD: Normal bowel sounds; soft; non-distended; non-tender; no hepatosplenomegaly.  EXT: Normal ROM.   : chaperone PA Frasca - normal appearing external genitalia, white curdlike discharge around introitus and adherent to walls  NEURO: Alert, oriented. Grossly unremarkable. No focal deficits.  PSYCH: Cooperative, appropriate.

## 2024-02-15 LAB
CULTURE RESULTS: SIGNIFICANT CHANGE UP
SPECIMEN SOURCE: SIGNIFICANT CHANGE UP

## 2024-02-20 ENCOUNTER — APPOINTMENT (OUTPATIENT)
Dept: OBGYN | Facility: CLINIC | Age: 39
End: 2024-02-20
Payer: MEDICAID

## 2024-02-20 DIAGNOSIS — Z01.419 ENCOUNTER FOR GYNECOLOGICAL EXAMINATION (GENERAL) (ROUTINE) W/OUT ABNORMAL FINDINGS: ICD-10-CM

## 2024-02-20 PROCEDURE — 99395 PREV VISIT EST AGE 18-39: CPT

## 2024-02-20 PROCEDURE — 99213 OFFICE O/P EST LOW 20 MIN: CPT | Mod: 25

## 2024-02-20 RX ORDER — TERCONAZOLE 8 MG/G
0.8 CREAM VAGINAL
Qty: 1 | Refills: 1 | Status: ACTIVE | COMMUNITY
Start: 2024-02-20 | End: 1900-01-01

## 2024-02-20 RX ORDER — FLUCONAZOLE 150 MG/1
150 TABLET ORAL
Qty: 1 | Refills: 1 | Status: ACTIVE | COMMUNITY
Start: 2024-02-20 | End: 1900-01-01

## 2024-02-20 NOTE — PLAN
[FreeTextEntry1] : RORO ALEJO is a 38 year female for pap and culture  diflucan and terazol cream.  right upper quadrant/left lower quadrant/left upper quadrant

## 2024-02-22 LAB — HPV HIGH+LOW RISK DNA PNL CVX: NOT DETECTED

## 2024-02-23 NOTE — ASU DISCHARGE PLAN (ADULT/PEDIATRIC). - ASU FOLLOWUP
Problem: Adult Inpatient Plan of Care  Goal: Plan of Care Review  Outcome: Ongoing, Not Progressing  Pt remains on vent via trach, sedated to RASS -2 with fentanyl gtt. Afebrile. NSR on monitor. BP labile, Central line infusing Levo- titrated per orders. 30-70ml/hr urine output via sterling cath. Pt turned q2hrs, green heel boots in place. Pt transferred to specialty bed this AM, tolerated well. Contact precautions remain in place. Will continue with current POC and update as needed.     911 or go to the nearest Emergency Room

## 2024-02-27 LAB — CYTOLOGY CVX/VAG DOC THIN PREP: NORMAL

## 2024-03-18 RX ORDER — TERCONAZOLE 8 MG/G
0.8 CREAM VAGINAL
Qty: 1 | Refills: 1 | Status: ACTIVE | COMMUNITY
Start: 2024-03-18 | End: 1900-01-01

## 2024-03-18 RX ORDER — FLUCONAZOLE 150 MG/1
150 TABLET ORAL
Qty: 3 | Refills: 1 | Status: ACTIVE | COMMUNITY
Start: 2024-03-18 | End: 1900-01-01

## 2024-03-28 ENCOUNTER — APPOINTMENT (OUTPATIENT)
Dept: OBGYN | Facility: CLINIC | Age: 39
End: 2024-03-28
Payer: MEDICAID

## 2024-03-28 VITALS
WEIGHT: 245 LBS | HEIGHT: 62 IN | HEART RATE: 96 BPM | SYSTOLIC BLOOD PRESSURE: 129 MMHG | DIASTOLIC BLOOD PRESSURE: 86 MMHG | BODY MASS INDEX: 45.08 KG/M2

## 2024-03-28 DIAGNOSIS — D64.9 ANEMIA, UNSPECIFIED: ICD-10-CM

## 2024-03-28 DIAGNOSIS — Z78.9 OTHER SPECIFIED HEALTH STATUS: ICD-10-CM

## 2024-03-28 DIAGNOSIS — Z82.49 FAMILY HISTORY OF ISCHEMIC HEART DISEASE AND OTHER DISEASES OF THE CIRCULATORY SYSTEM: ICD-10-CM

## 2024-03-28 DIAGNOSIS — Z83.3 FAMILY HISTORY OF DIABETES MELLITUS: ICD-10-CM

## 2024-03-28 PROCEDURE — 99213 OFFICE O/P EST LOW 20 MIN: CPT

## 2024-03-28 RX ORDER — FLUCONAZOLE 150 MG/1
150 TABLET ORAL
Qty: 4 | Refills: 4 | Status: COMPLETED | COMMUNITY
Start: 2024-03-28 | End: 2024-05-02

## 2024-03-28 NOTE — HISTORY OF PRESENT ILLNESS
[FreeTextEntry1] : Patient in office for possible yeast infection. Patient state they had vaginal itching but it has stopped today. Patient states every month they have itching.

## 2024-03-28 NOTE — PLAN
[FreeTextEntry1] : I recommended to stop using all creams and all local feminine products that are causing her a local chemical irritant vulvitis and yeast vaginitis and to take diflucan 150 mg po qod for 7 days and then 1 tab po q weekly for 12 weeks. rto 6 weeks for follow up

## 2024-05-09 ENCOUNTER — APPOINTMENT (OUTPATIENT)
Dept: OBGYN | Facility: CLINIC | Age: 39
End: 2024-05-09

## 2024-05-09 ENCOUNTER — APPOINTMENT (OUTPATIENT)
Dept: OBGYN | Facility: CLINIC | Age: 39
End: 2024-05-09
Payer: MEDICAID

## 2024-05-09 VITALS
WEIGHT: 245 LBS | HEIGHT: 62 IN | HEART RATE: 87 BPM | BODY MASS INDEX: 45.08 KG/M2 | SYSTOLIC BLOOD PRESSURE: 122 MMHG | DIASTOLIC BLOOD PRESSURE: 88 MMHG

## 2024-05-09 DIAGNOSIS — B37.31 ACUTE CANDIDIASIS OF VULVA AND VAGINA: ICD-10-CM

## 2024-05-09 PROCEDURE — 99213 OFFICE O/P EST LOW 20 MIN: CPT

## 2024-05-09 NOTE — PLAN
[FreeTextEntry1] : VAGINITIS PANEL PERFORMED FOR YEAST INFECTION. PATIENT TOOK 3 DIFLUCANS AND HAS FELT RELIEF. HAS NOT TAKEN DIFLUCAN 1 TABLET ONCE A WEEK. I RECOMMENDED TO NOT TREAT AT THIS TIME AND CULTURES PERFORMED BUT PATIENT ASYMPTOMATIC WITHOUT ANY IRRITATIVE SYMPTOMS

## 2024-05-09 NOTE — PHYSICAL EXAM
[Labia Majora] : normal [Labia Minora] : normal [Normal] : normal [FreeTextEntry4] : MUCUS LIKE WHITE DISCHARGE

## 2024-05-21 ENCOUNTER — EMERGENCY (EMERGENCY)
Facility: HOSPITAL | Age: 39
LOS: 0 days | Discharge: HOME | End: 2024-05-21
Attending: STUDENT IN AN ORGANIZED HEALTH CARE EDUCATION/TRAINING PROGRAM
Payer: MEDICAID

## 2024-05-21 VITALS
DIASTOLIC BLOOD PRESSURE: 79 MMHG | HEART RATE: 89 BPM | RESPIRATION RATE: 17 BRPM | TEMPERATURE: 98 F | SYSTOLIC BLOOD PRESSURE: 135 MMHG | OXYGEN SATURATION: 100 % | WEIGHT: 244.93 LBS | HEIGHT: 62 IN

## 2024-05-21 DIAGNOSIS — J02.9 ACUTE PHARYNGITIS, UNSPECIFIED: ICD-10-CM

## 2024-05-21 DIAGNOSIS — Z98.82 BREAST IMPLANT STATUS: Chronic | ICD-10-CM

## 2024-05-21 DIAGNOSIS — Z98.890 OTHER SPECIFIED POSTPROCEDURAL STATES: Chronic | ICD-10-CM

## 2024-05-21 DIAGNOSIS — Z98.891 HISTORY OF UTERINE SCAR FROM PREVIOUS SURGERY: Chronic | ICD-10-CM

## 2024-05-21 DIAGNOSIS — Z98.84 BARIATRIC SURGERY STATUS: Chronic | ICD-10-CM

## 2024-05-21 PROCEDURE — 99283 EMERGENCY DEPT VISIT LOW MDM: CPT

## 2024-05-21 PROCEDURE — 99284 EMERGENCY DEPT VISIT MOD MDM: CPT

## 2024-05-21 RX ORDER — DEXAMETHASONE 0.5 MG/5ML
10 ELIXIR ORAL ONCE
Refills: 0 | Status: COMPLETED | OUTPATIENT
Start: 2024-05-21 | End: 2024-05-21

## 2024-05-21 RX ORDER — IBUPROFEN 200 MG
600 TABLET ORAL ONCE
Refills: 0 | Status: COMPLETED | OUTPATIENT
Start: 2024-05-21 | End: 2024-05-21

## 2024-05-21 RX ADMIN — Medication 600 MILLIGRAM(S): at 09:47

## 2024-05-21 NOTE — ED PROVIDER NOTE - OBJECTIVE STATEMENT
38-year-old female, no past medical history, presenting with 3 days of sore throat, body aches, fevers Tmax 103, alleviated by acetaminophen, no aggravating factors.  Denies chest pain, shortness of breath, nausea, vomiting, abdominal pain, diarrhea, sick contacts, cough, rhinorrhea.

## 2024-05-21 NOTE — ED ADULT NURSE NOTE - NSFALLUNIVINTERV_ED_ALL_ED
Bed/Stretcher in lowest position, wheels locked, appropriate side rails in place/Call bell, personal items and telephone in reach/Instruct patient to call for assistance before getting out of bed/chair/stretcher/Non-slip footwear applied when patient is off stretcher/[place] to call system/Physically safe environment - no spills, clutter or unnecessary equipment/Purposeful proactive rounding/Room/bathroom lighting operational, light cord in reach

## 2024-05-21 NOTE — ED PROVIDER NOTE - CARE PROVIDER_API CALL
Geovanni Cosbyanor  Internal Medicine  N  FERNY ARMENTA, Phys,    Phone: ([phone]  Fax: ([phone]  Follow Up Time: 1-3 Days

## 2024-05-21 NOTE — ED PROVIDER NOTE - PATIENT PORTAL LINK FT
You can access the FollowMyHealth Patient Portal offered by [facility] by registering at the following website: [email or web address]. By joining [employer]’s FollowMyHealth portal, you will also be able to view your health information using other applications (apps) compatible with our system.

## 2024-05-21 NOTE — ED PROVIDER NOTE - NSFOLLOWUPINSTRUCTIONS_ED_ALL_ED_FT
38-year-old female presenting for sore throat, body aches, fever for 3 days, bilateral tonsillar exudates noted on exam.  We patent and tolerating secretions and uvula midline.  Lungs clear to auscultation bilaterally.  Heart RRR.  Otherwise well-appearing on exam in no acute distress.  Vitals reviewed and afebrile.  Medications given and effects reassessed.  Medication sent to pharmacy.  Discussed return precautions and follow-up outpatient.  Patient comfortable with plan.

## 2024-05-21 NOTE — ED PROVIDER NOTE - PHYSICAL EXAMINATION
CONSTITUTIONAL: Well-developed; well-nourished; in no acute distress.   SKIN: warm, dry  HEAD: Normocephalic;   EYES: no conjunctival erythema  ENT: No nasal discharge; airway clear. +bilateral tonsilar exudates. uvula midline. tolerating secretions.  NECK: Supple; non tender.  CARD: S1, S2 normal; Regular rate and rhythm.   RESP: No wheezes, rales or rhonchi.  ABD: soft ntnd  EXT: Normal ROM.  No clubbing, cyanosis or edema.   NEURO: Alert, oriented, grossly unremarkable  PSYCH: Cooperative, appropriate.

## 2024-05-21 NOTE — ED ADULT NURSE NOTE - NS ED NOTE ABUSE SUSPICION NEGLECT YN
PACU ANESTHESIA ADMISSION NOTE          ____  Intubated  TV:______       Rate: ______      FiO2: ______    _x___  Patent Airway    _x___  Full return of protective reflexes    _x___  Full recovery from anesthesia / back to baseline status    Vitals:  T(C): 36.7  HR: 65  BP: 135/73  RR: 16  SpO2: 100%    Mental Status:  _x___ Awake   _____ Alert   _____ Drowsy   _____ Sedated    Nausea/Vomiting:  _x___  NO       ______Yes,   See Post - Op Orders         Pain Scale (0-10):  __0___    Treatment: _x___ None    ____ See Post - Op/PCA Orders    Post - Operative Fluids:   __x__ Oral   ____ See Post - Op Orders    Plan: Discharge:   _x___Home       _____Floor     _____Critical Care    _____  Other:_________________    Comments:  No anesthesia issues or complications noted.  Discharge when criteria met.
No

## 2024-10-25 ENCOUNTER — NON-APPOINTMENT (OUTPATIENT)
Age: 39
End: 2024-10-25

## 2024-12-09 NOTE — ED ADULT NURSE NOTE - NS ED NURSE LEVEL OF CONSCIOUSNESS AFFECT
Population Health Chart Review & Patient Outreach Details    Updates Requested / Reviewed:  [x]  Care Team Updated    Health Maintenance Topics Addressed and Outreach Outcomes / Actions Taken:  Diabetic Eye Exam [x] HM Updated with March 2024 Eye Exam (Dr. Dawn). History Updated.                  Calm

## 2025-01-29 ENCOUNTER — EMERGENCY (EMERGENCY)
Facility: HOSPITAL | Age: 40
LOS: 0 days | Discharge: HOME | End: 2025-01-29
Attending: EMERGENCY MEDICINE
Payer: MEDICAID

## 2025-01-29 VITALS
OXYGEN SATURATION: 99 % | RESPIRATION RATE: 18 BRPM | HEIGHT: 62 IN | DIASTOLIC BLOOD PRESSURE: 86 MMHG | HEART RATE: 82 BPM | TEMPERATURE: 98 F | SYSTOLIC BLOOD PRESSURE: 121 MMHG | WEIGHT: 255.07 LBS

## 2025-01-29 DIAGNOSIS — R68.83 CHILLS (WITHOUT FEVER): ICD-10-CM

## 2025-01-29 DIAGNOSIS — Z98.891 HISTORY OF UTERINE SCAR FROM PREVIOUS SURGERY: Chronic | ICD-10-CM

## 2025-01-29 DIAGNOSIS — Z98.82 BREAST IMPLANT STATUS: Chronic | ICD-10-CM

## 2025-01-29 DIAGNOSIS — J02.9 ACUTE PHARYNGITIS, UNSPECIFIED: ICD-10-CM

## 2025-01-29 DIAGNOSIS — R07.0 PAIN IN THROAT: ICD-10-CM

## 2025-01-29 DIAGNOSIS — Z98.890 OTHER SPECIFIED POSTPROCEDURAL STATES: Chronic | ICD-10-CM

## 2025-01-29 DIAGNOSIS — H92.01 OTALGIA, RIGHT EAR: ICD-10-CM

## 2025-01-29 DIAGNOSIS — Z98.84 BARIATRIC SURGERY STATUS: Chronic | ICD-10-CM

## 2025-01-29 LAB
FLUAV AG NPH QL: SIGNIFICANT CHANGE UP
FLUBV AG NPH QL: SIGNIFICANT CHANGE UP
RSV RNA NPH QL NAA+NON-PROBE: SIGNIFICANT CHANGE UP
SARS-COV-2 RNA SPEC QL NAA+PROBE: SIGNIFICANT CHANGE UP

## 2025-01-29 PROCEDURE — 99284 EMERGENCY DEPT VISIT MOD MDM: CPT | Mod: 25

## 2025-01-29 PROCEDURE — 0241U: CPT

## 2025-01-29 PROCEDURE — 99283 EMERGENCY DEPT VISIT LOW MDM: CPT | Mod: 25

## 2025-01-29 PROCEDURE — 96372 THER/PROPH/DIAG INJ SC/IM: CPT

## 2025-01-29 RX ORDER — AMOXICILLIN/POTASSIUM CLAV 875-125 MG
875 TABLET ORAL
Qty: 20 | Refills: 0
Start: 2025-01-29 | End: 2025-02-07

## 2025-01-29 RX ORDER — DIPHENHYDRAMINE HYDROCHLORIDE AND LIDOCAINE HYDROCHLORIDE AND ALUMINUM HYDROXIDE AND MAGNESIUM HYDRO
10 KIT ONCE
Refills: 0 | Status: COMPLETED | OUTPATIENT
Start: 2025-01-29 | End: 2025-01-29

## 2025-01-29 RX ORDER — KETOROLAC TROMETHAMINE 30 MG/ML
30 INJECTION INTRAMUSCULAR; INTRAVENOUS ONCE
Refills: 0 | Status: DISCONTINUED | OUTPATIENT
Start: 2025-01-29 | End: 2025-01-29

## 2025-01-29 RX ORDER — DEXAMETHASONE SODIUM PHOSPHATE 4 MG/ML
10 VIAL (ML) INJECTION ONCE
Refills: 0 | Status: COMPLETED | OUTPATIENT
Start: 2025-01-29 | End: 2025-01-29

## 2025-01-29 RX ADMIN — Medication 10 MILLIGRAM(S): at 06:07

## 2025-01-29 RX ADMIN — KETOROLAC TROMETHAMINE 30 MILLIGRAM(S): 30 INJECTION INTRAMUSCULAR; INTRAVENOUS at 06:07

## 2025-01-29 RX ADMIN — DIPHENHYDRAMINE HYDROCHLORIDE AND LIDOCAINE HYDROCHLORIDE AND ALUMINUM HYDROXIDE AND MAGNESIUM HYDRO 10 MILLILITER(S): KIT at 06:32

## 2025-01-29 NOTE — ED PROVIDER NOTE - OBJECTIVE STATEMENT
Patient is a 39y Female no PMH who presents to the ED with complaints of right ear pain and throat pain that began 1 day ago, associated with chills. Has not taken anything for pain. No sick contacts. Denies fever, CP, SOB, cough, N/V/D,

## 2025-01-29 NOTE — ED PROVIDER NOTE - PATIENT PORTAL LINK FT
You can access the FollowMyHealth Patient Portal offered by [facility] by registering at the following website: [email or web address]. By joining [employer]’s FollowMyHealth portal, you will also be able to view your health information using other applications (apps) compatible with our system.

## 2025-01-29 NOTE — ED PROVIDER NOTE - NSFOLLOWUPINSTRUCTIONS_ED_ALL_ED_FT
Follow up with your PCP this week.     Otitis Media, Adult  An ear, with close-ups of a normal ear and an ear filled with fluid.  Otitis media occurs when there is inflammation and fluid in the middle ear with signs and symptoms of an acute infection. The middle ear is a part of the ear that contains bones for hearing as well as air that helps send sounds to the brain. When infected fluid builds up in this space, it causes pressure and can lead to an ear infection. The eustachian tube connects the middle ear to the back of the nose (nasopharynx) and normally allows air into the middle ear. If the eustachian tube becomes blocked, fluid can build up and become infected.    What are the causes?  This condition is caused by a blockage in the eustachian tube. This can be caused by mucus or by swelling of the tube. Problems that can cause a blockage include:  A cold or other upper respiratory infection.  Allergies.  An irritant, such as tobacco smoke.  Enlarged adenoids. The adenoids are areas of soft tissue located high in the back of the throat, behind the nose and the roof of the mouth. They are part of the body's defense system (immune system).  A mass in the nasopharynx.  Damage to the ear caused by pressure changes (barotrauma).  What increases the risk?  You are more likely to develop this condition if you:  Smoke or are exposed to tobacco smoke.  Have an opening in the roof of your mouth (cleft palate).  Have gastroesophageal reflux.  Have an immune system disorder.  What are the signs or symptoms?  Symptoms of this condition include:  Ear pain.  Fever.  Decreased hearing.  Tiredness (lethargy).  Fluid leaking from the ear, if the eardrum is ruptured or has burst.  Ringing in the ear.  How is this diagnosed?  A health care provider checks a person's ear using an otoscope. A close-up of the ear and otoscope is also shown.  This condition is diagnosed with a physical exam. During the exam, your health care provider will use an instrument called an otoscope to look in your ear and check for redness, swelling, and fluid. He or she will also ask about your symptoms.    Your health care provider may also order tests, such as:  A pneumatic otoscopy. This is a test to check the movement of the eardrum. It is done by squeezing a small amount of air into the ear.  A tympanogram. This is a test that shows how well the eardrum moves in response to air pressure in the ear canal. It provides a graph for your health care provider to review.  How is this treated?  This condition can go away on its own within 3–5 days. But if the condition is caused by a bacterial infection and does not go away on its own, or if it keeps coming back, your health care provider may:  Prescribe antibiotic medicine to treat the infection.  Prescribe or recommend medicines to control pain.  Follow these instructions at home:  Take over-the-counter and prescription medicines only as told by your health care provider.  If you were prescribed an antibiotic medicine, take it as told by your health care provider. Do not stop taking the antibiotic even if you start to feel better.  Keep all follow-up visits. This is important.  Contact a health care provider if:  You have bleeding from your nose.  There is a lump on your neck.  You are not feeling better in 5 days.  You feel worse instead of better.  Get help right away if:  You have severe pain that is not controlled with medicine.  You have swelling, redness, or pain around your ear.  You have stiffness in your neck.  A part of your face is not moving (paralyzed).  The bone behind your ear (mastoid bone) is tender when you touch it.  You develop a severe headache.  Summary  Otitis media is redness, soreness, and swelling of the middle ear, usually resulting in pain and decreased hearing.  This condition can go away on its own within 3–5 days.  If the problem does not go away in 3–5 days, your health care provider may give you medicines to treat the infection.  If you were prescribed an antibiotic medicine, take it as told by your health care provider.  Follow all instructions that were given to you by your health care provider.  This information is not intended to replace advice given to you by your health care provider. Make sure you discuss any questions you have with your health care provider.    Document Revised: 03/28/2022 Document Reviewed: 03/28/2022  [employer] Patient Education © 2024 [employer] Pharyngitis    Pharyngitis is inflammation of your pharynx, which is typically caused by a viral or bacterial infection. Pharyngitis can be contagious and may spread from person to person through intimate contact, coughing, sneezing, or sharing personal items and utensils. Symptoms of pharyngitis may include sore throat, fever, headache, or swollen lymph nodes. If you are prescribed antibiotics, make sure you finish them even if you start to feel better. Gargle with salt water as often as every 1-2 hours to soothe your throat. Throat lozenges (if you are not at risk for choking) or sprays may be used to soothe your throat.    SEEK IMMEDIATE MEDICAL CARE IF YOU HAVE ANY OF THE FOLLOWING SYMPTOMS: neck stiffness, drooling, hoarseness or change in voice, inability to swallow liquids, vomiting, or trouble breathing.    [employer]vier Patient Education © 2024 [employer]

## 2025-01-29 NOTE — ED PROVIDER NOTE - PHYSICAL EXAMINATION
VITAL SIGNS: I have reviewed nursing notes and confirm.  CONSTITUTIONAL: In no acute distress.  SKIN: Skin exam is warm and dry.  HEAD: Normocephalic; atraumatic.  EYES: PERRL, EOM intact; conjunctiva and sclera clear.  ENT: No nasal discharge; airway clear. Right TM erythematous, non-bulging. Posterior oropharynx erythematous, tonsils without exudates or lesions. BL tonsilar hypertrophy, uvula midline, airway patent.   NECK: Supple; non tender.  CARD: S1, S2; Regular rate and rhythm.  RESP: CTAB. Speaking in full sentences.   EXT: Normal ROM.  NEURO: Alert, oriented. Grossly unremarkable. No focal deficits.
